# Patient Record
Sex: MALE | Race: BLACK OR AFRICAN AMERICAN | NOT HISPANIC OR LATINO | Employment: OTHER | URBAN - METROPOLITAN AREA
[De-identification: names, ages, dates, MRNs, and addresses within clinical notes are randomized per-mention and may not be internally consistent; named-entity substitution may affect disease eponyms.]

---

## 2023-08-25 PROBLEM — C61 MALIGNANT TUMOR OF PROSTATE (MULTI): Status: ACTIVE | Noted: 2023-08-25

## 2023-08-25 PROBLEM — M25.519 SHOULDER PAIN: Status: ACTIVE | Noted: 2023-08-25

## 2023-08-25 PROBLEM — I10 HYPERTENSION: Status: ACTIVE | Noted: 2023-08-25

## 2023-08-25 PROBLEM — E78.5 HYPERLIPIDEMIA: Status: ACTIVE | Noted: 2023-08-25

## 2023-08-25 PROBLEM — M25.539 WRIST PAIN: Status: ACTIVE | Noted: 2023-08-25

## 2023-08-25 PROBLEM — E10.65 TYPE 1 DIABETES MELLITUS WITH HYPERGLYCEMIA (MULTI): Status: ACTIVE | Noted: 2023-08-25

## 2023-08-25 PROBLEM — I25.10 CORONARY ARTERY DISEASE: Status: ACTIVE | Noted: 2023-08-25

## 2023-08-25 PROBLEM — I95.1 ORTHOSTATIC HYPOTENSION: Status: ACTIVE | Noted: 2023-08-25

## 2023-08-25 PROBLEM — R06.00 DYSPNEA: Status: ACTIVE | Noted: 2023-08-25

## 2023-08-25 PROBLEM — M79.609 PAIN IN LIMB: Status: ACTIVE | Noted: 2023-08-25

## 2023-08-25 PROBLEM — M54.9 BACK PAIN: Status: ACTIVE | Noted: 2023-08-25

## 2023-08-25 PROBLEM — S86.111A STRAIN OF GASTROCNEMIUS MUSCLE, RIGHT, INITIAL ENCOUNTER: Status: ACTIVE | Noted: 2023-08-25

## 2023-08-25 PROBLEM — M62.89 HAMSTRING TIGHTNESS: Status: ACTIVE | Noted: 2023-08-25

## 2023-08-25 PROBLEM — N41.9 PROSTATITIS: Status: ACTIVE | Noted: 2023-08-25

## 2023-08-25 PROBLEM — E78.00 PURE HYPERCHOLESTEROLEMIA: Status: ACTIVE | Noted: 2023-08-25

## 2023-08-25 PROBLEM — E10.319: Status: ACTIVE | Noted: 2023-08-25

## 2023-08-25 PROBLEM — E56.9 VITAMIN DEFICIENCY: Status: ACTIVE | Noted: 2023-08-25

## 2023-08-25 PROBLEM — I21.4: Status: ACTIVE | Noted: 2023-08-25

## 2023-08-25 PROBLEM — H25.12 AGE-RELATED NUCLEAR CATARACT OF LEFT EYE: Status: ACTIVE | Noted: 2023-08-25

## 2023-08-25 PROBLEM — M25.562 BILATERAL KNEE PAIN: Status: ACTIVE | Noted: 2023-08-25

## 2023-08-25 PROBLEM — I20.0 UNSTABLE ANGINA (MULTI): Status: ACTIVE | Noted: 2023-08-25

## 2023-08-25 PROBLEM — H26.9 CATARACT: Status: ACTIVE | Noted: 2023-08-25

## 2023-08-25 PROBLEM — E10.3292: Status: ACTIVE | Noted: 2023-08-25

## 2023-08-25 PROBLEM — H40.1190 POAG (PRIMARY OPEN-ANGLE GLAUCOMA): Status: ACTIVE | Noted: 2023-08-25

## 2023-08-25 PROBLEM — M25.561 BILATERAL KNEE PAIN: Status: ACTIVE | Noted: 2023-08-25

## 2023-08-25 PROBLEM — K40.90 INGUINAL HERNIA, RIGHT: Status: ACTIVE | Noted: 2023-08-25

## 2023-08-25 RX ORDER — HYDROCODONE BITARTRATE AND HOMATROPINE METHYLBROMIDE 5; 1.5 MG/5ML; MG/5ML
5 SOLUTION ORAL NIGHTLY
COMMUNITY
Start: 2018-02-10 | End: 2023-11-29 | Stop reason: WASHOUT

## 2023-08-25 RX ORDER — ASPIRIN 81 MG/1
81 TABLET ORAL DAILY
COMMUNITY

## 2023-08-25 RX ORDER — CLOPIDOGREL BISULFATE 75 MG/1
75 TABLET ORAL DAILY
COMMUNITY
Start: 2017-08-25 | End: 2023-12-06

## 2023-08-25 RX ORDER — EZETIMIBE 10 MG/1
10 TABLET ORAL DAILY
COMMUNITY

## 2023-08-25 RX ORDER — TAMSULOSIN HYDROCHLORIDE 0.4 MG/1
0.4 CAPSULE ORAL DAILY
COMMUNITY
Start: 2015-07-10 | End: 2023-11-29 | Stop reason: WASHOUT

## 2023-08-25 RX ORDER — CARVEDILOL 25 MG/1
12.5 TABLET ORAL
COMMUNITY
Start: 2017-02-24

## 2023-08-25 RX ORDER — INSULIN LISPRO 100 [IU]/ML
INJECTION, SOLUTION INTRAVENOUS; SUBCUTANEOUS
COMMUNITY
Start: 2016-12-06 | End: 2023-11-29 | Stop reason: WASHOUT

## 2023-08-25 RX ORDER — IBUPROFEN 600 MG/1
600 TABLET ORAL 3 TIMES DAILY
COMMUNITY
Start: 2021-04-21 | End: 2023-11-29 | Stop reason: WASHOUT

## 2023-08-25 RX ORDER — BENAZEPRIL HYDROCHLORIDE 20 MG/1
20 TABLET ORAL DAILY
COMMUNITY
Start: 2021-03-13 | End: 2023-11-29 | Stop reason: WASHOUT

## 2023-08-25 RX ORDER — ATORVASTATIN CALCIUM 80 MG/1
80 TABLET, FILM COATED ORAL DAILY
COMMUNITY
Start: 2014-01-31 | End: 2024-03-18

## 2023-08-25 RX ORDER — PEN NEEDLE, DIABETIC 30 GX3/16"
NEEDLE, DISPOSABLE MISCELLANEOUS 4 TIMES DAILY
COMMUNITY
Start: 2020-12-08 | End: 2024-02-01 | Stop reason: WASHOUT

## 2023-08-25 RX ORDER — BENAZEPRIL HYDROCHLORIDE 10 MG/1
10 TABLET ORAL DAILY
COMMUNITY
Start: 2014-02-20 | End: 2024-03-01

## 2023-08-25 RX ORDER — MELOXICAM 7.5 MG/1
7.5 TABLET ORAL DAILY
COMMUNITY
Start: 2020-11-17 | End: 2023-11-29 | Stop reason: WASHOUT

## 2023-08-25 RX ORDER — INSULIN DEGLUDEC 100 U/ML
28 INJECTION, SOLUTION SUBCUTANEOUS DAILY
COMMUNITY
Start: 2021-11-05 | End: 2023-11-29 | Stop reason: WASHOUT

## 2023-08-25 RX ORDER — INSULIN GLARGINE 100 [IU]/ML
30 INJECTION, SOLUTION SUBCUTANEOUS DAILY
COMMUNITY
Start: 2016-08-02 | End: 2023-11-29 | Stop reason: WASHOUT

## 2023-09-21 ENCOUNTER — HOSPITAL ENCOUNTER (OUTPATIENT)
Dept: DATA CONVERSION | Facility: HOSPITAL | Age: 78
Discharge: HOME | End: 2023-09-21
Payer: MEDICARE

## 2023-09-21 DIAGNOSIS — S93.402A SPRAIN OF UNSPECIFIED LIGAMENT OF LEFT ANKLE, INITIAL ENCOUNTER: ICD-10-CM
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
## 2023-09-21 DIAGNOSIS — M25.572 PAIN IN LEFT ANKLE AND JOINTS OF LEFT FOOT: ICD-10-CM

## 2023-09-21 DIAGNOSIS — S99.912A UNSPECIFIED INJURY OF LEFT ANKLE, INITIAL ENCOUNTER: ICD-10-CM

## 2023-10-02 ENCOUNTER — OFFICE VISIT (OUTPATIENT)
Dept: OPHTHALMOLOGY | Facility: CLINIC | Age: 78
End: 2023-10-02
Payer: MEDICARE

## 2023-10-02 DIAGNOSIS — H40.003 GLAUCOMA SUSPECT OF BOTH EYES: Primary | ICD-10-CM

## 2023-10-02 DIAGNOSIS — H52.12 MYOPIA, LEFT: ICD-10-CM

## 2023-10-02 DIAGNOSIS — Z79.4 TYPE 2 DIABETES MELLITUS WITHOUT COMPLICATION, WITH LONG-TERM CURRENT USE OF INSULIN (MULTI): ICD-10-CM

## 2023-10-02 DIAGNOSIS — E11.9 TYPE 2 DIABETES MELLITUS WITHOUT COMPLICATION, WITH LONG-TERM CURRENT USE OF INSULIN (MULTI): ICD-10-CM

## 2023-10-02 PROCEDURE — 92133 CPTRZD OPH DX IMG PST SGM ON: CPT | Performed by: OPHTHALMOLOGY

## 2023-10-02 PROCEDURE — 1126F AMNT PAIN NOTED NONE PRSNT: CPT | Performed by: OPHTHALMOLOGY

## 2023-10-02 PROCEDURE — 99214 OFFICE O/P EST MOD 30 MIN: CPT | Performed by: OPHTHALMOLOGY

## 2023-10-02 PROCEDURE — 1159F MED LIST DOCD IN RCRD: CPT | Performed by: OPHTHALMOLOGY

## 2023-10-02 ASSESSMENT — SLIT LAMP EXAM - LIDS
COMMENTS: PROSTHESIS
COMMENTS: NORMAL

## 2023-10-02 ASSESSMENT — ENCOUNTER SYMPTOMS
ALLERGIC/IMMUNOLOGIC NEGATIVE: 0
NEUROLOGICAL NEGATIVE: 0
GASTROINTESTINAL NEGATIVE: 0
ENDOCRINE NEGATIVE: 0
CARDIOVASCULAR NEGATIVE: 0
MUSCULOSKELETAL NEGATIVE: 0
RESPIRATORY NEGATIVE: 0
HEMATOLOGIC/LYMPHATIC NEGATIVE: 0
PSYCHIATRIC NEGATIVE: 0
CONSTITUTIONAL NEGATIVE: 0
EYES NEGATIVE: 0

## 2023-10-02 ASSESSMENT — CONF VISUAL FIELD
OD_INFERIOR_NASAL_RESTRICTION: 0
OD_SUPERIOR_TEMPORAL_RESTRICTION: 0
OD_INFERIOR_TEMPORAL_RESTRICTION: 0
OD_SUPERIOR_NASAL_RESTRICTION: 0
OD_NORMAL: 1

## 2023-10-02 ASSESSMENT — EXTERNAL EXAM - LEFT EYE: OS_EXAM: NORMAL

## 2023-10-02 ASSESSMENT — VISUAL ACUITY
OS_CC: 20/20
OS_CC+: -1
METHOD: SNELLEN - LINEAR
OD_SC: PROSTHESIS

## 2023-10-02 ASSESSMENT — CUP TO DISC RATIO: OS_RATIO: 0.7

## 2023-10-02 ASSESSMENT — EXTERNAL EXAM - RIGHT EYE: OD_EXAM: NORMAL

## 2023-10-02 ASSESSMENT — TONOMETRY
IOP_METHOD: GOLDMANN APPLANATION
OS_IOP_MMHG: 19

## 2023-10-02 NOTE — PROGRESS NOTES
"Subjective   Chief Complaint    Glaucoma Suspect       HPI       Glaucoma Suspect    In left eye.  Quality: stable.  Side effects of treatment include none.  Treatment compliance is always.             Comments    77 Year old male presents for  glaucoma suspect fuv. Not on drops. Pt denies any changes in vision.           Last edited by ELIZABETH Phipps on 10/2/2023 10:37 AM.        Eye Meds  None     Objective    External Exam         Right Left    External Normal Normal              Slit Lamp Exam         Right Left    Lids/Lashes prosthesis Normal    Conjunctiva/Sclera  White and quiet    Cornea  Clear    Anterior Chamber  Deep and quiet    Iris  Round and reactive    Lens 1-2+ Nuclear sclerosis 1-2+ Nuclear sclerosis    Anterior Vitreous  Normal              Fundus Exam         Right Left    Disc prosthesis Normal    C/D Ratio  0.55    Macula  Normal    Vessels  Normal    Periphery  Normal                   <div id=\"MAIN_EXAM_REVIEWED\"></div>     OCT: robust RNFL left eye (OS) G74 stable          Assessment/Plan    Glaucoma Suspect    Plan    diabetes without  retinopathy, left eye    continue BS control         glaucoma suspect, left eye:    no family hx    no trauma    gonio open    pach thin    oct rnfl 10/2/23  still overal robust with good rim area, stable from previous    HVF 24-2 7/28/21 WNL, no evidence of glaucoma    IOP stable, slightly higher than ideal given thinner pach    will monitor closely given monocular    rtc 6 months for HVF 24-2 and DFE         monoocula rstatus: 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 monitor         myopia with presbyopia, left eye:    Establish with Dr. Staton for mrx         "

## 2023-10-09 ENCOUNTER — APPOINTMENT (OUTPATIENT)
Dept: ENDOCRINOLOGY | Facility: CLINIC | Age: 78
End: 2023-10-09
Payer: MEDICARE

## 2023-10-09 NOTE — PROGRESS NOTES
76yo with dm1 here for tslim/dexcom pump training.     Medications:           Tresiba 23 units           Humalog ICR 1:4,  isf 20 >120    1. 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

## 2023-11-07 ENCOUNTER — PHARMACY VISIT (OUTPATIENT)
Dept: PHARMACY | Facility: CLINIC | Age: 78
End: 2023-11-07
Payer: COMMERCIAL

## 2023-11-07 DIAGNOSIS — E10.319 TYPE 1 DIABETES MELLITUS WITH RETINOPATHY OF BOTH EYES WITHOUT MACULAR EDEMA, UNSPECIFIED RETINOPATHY SEVERITY (MULTI): Primary | ICD-10-CM

## 2023-11-07 PROCEDURE — RXMED WILLOW AMBULATORY MEDICATION CHARGE

## 2023-11-07 RX ORDER — INSULIN LISPRO 100 [IU]/ML
INJECTION, SOLUTION INTRAVENOUS; SUBCUTANEOUS
Qty: 90 ML | Refills: 3 | Status: SHIPPED | OUTPATIENT
Start: 2023-11-07 | End: 2023-11-29 | Stop reason: WASHOUT

## 2023-11-27 ENCOUNTER — OFFICE VISIT (OUTPATIENT)
Dept: OPHTHALMOLOGY | Facility: CLINIC | Age: 78
End: 2023-11-27
Payer: MEDICARE

## 2023-11-27 DIAGNOSIS — H52.4 PRESBYOPIA: ICD-10-CM

## 2023-11-27 DIAGNOSIS — H25.12 AGE-RELATED NUCLEAR CATARACT OF LEFT EYE: Primary | ICD-10-CM

## 2023-11-27 DIAGNOSIS — H52.12 MYOPIA, LEFT EYE: ICD-10-CM

## 2023-11-27 PROCEDURE — 92012 INTRM OPH EXAM EST PATIENT: CPT | Performed by: STUDENT IN AN ORGANIZED HEALTH CARE EDUCATION/TRAINING PROGRAM

## 2023-11-27 PROCEDURE — 92015 DETERMINE REFRACTIVE STATE: CPT | Performed by: STUDENT IN AN ORGANIZED HEALTH CARE EDUCATION/TRAINING PROGRAM

## 2023-11-27 ASSESSMENT — EXTERNAL EXAM - LEFT EYE: OS_EXAM: NORMAL

## 2023-11-27 ASSESSMENT — REFRACTION_MANIFEST
OS_SPHERE: -3.00
OS_ADD: +2.75
OD_SPHERE: BALANCE
OS_CYLINDER: +1.25
OS_AXIS: 115

## 2023-11-27 ASSESSMENT — CONF VISUAL FIELD
OD_SUPERIOR_NASAL_RESTRICTION: 1
OD_INFERIOR_TEMPORAL_RESTRICTION: 1
OS_INFERIOR_TEMPORAL_RESTRICTION: 0
OS_INFERIOR_NASAL_RESTRICTION: 0
OD_INFERIOR_NASAL_RESTRICTION: 1
OS_SUPERIOR_NASAL_RESTRICTION: 0
OS_NORMAL: 1
OD_SUPERIOR_TEMPORAL_RESTRICTION: 1
COMMENTS: PROSTHESIS OD
OS_SUPERIOR_TEMPORAL_RESTRICTION: 0

## 2023-11-27 ASSESSMENT — VISUAL ACUITY
OS_CC+: +2
CORRECTION_TYPE: GLASSES
OS_CC: 20/25
METHOD: SNELLEN - LINEAR
OD_SC: PROSTHESIS

## 2023-11-27 ASSESSMENT — REFRACTION_WEARINGRX
OS_ADD: +2.75
OS_AXIS: 112
OS_CYLINDER: +1.00
OD_SPHERE: BALANCE
OS_SPHERE: -3.50

## 2023-11-27 ASSESSMENT — EXTERNAL EXAM - RIGHT EYE: OD_EXAM: NORMAL

## 2023-11-27 ASSESSMENT — ENCOUNTER SYMPTOMS
EYES NEGATIVE: 0
CONSTITUTIONAL NEGATIVE: 0
GASTROINTESTINAL NEGATIVE: 0
RESPIRATORY NEGATIVE: 0
HEMATOLOGIC/LYMPHATIC NEGATIVE: 0
ALLERGIC/IMMUNOLOGIC NEGATIVE: 0
ENDOCRINE NEGATIVE: 0
PSYCHIATRIC NEGATIVE: 0
MUSCULOSKELETAL NEGATIVE: 0
CARDIOVASCULAR NEGATIVE: 0
NEUROLOGICAL NEGATIVE: 0

## 2023-11-27 ASSESSMENT — CUP TO DISC RATIO: OS_RATIO: 0.7

## 2023-11-27 ASSESSMENT — TONOMETRY
OS_IOP_MMHG: 20
IOP_METHOD: GOLDMANN APPLANATION
OD_IOP_MMHG: PROSTHESIS

## 2023-11-27 ASSESSMENT — SLIT LAMP EXAM - LIDS
COMMENTS: DERMATOCHALASIS
COMMENTS: DERMATOCHALASIS

## 2023-11-27 NOTE — PROGRESS NOTES
Assessment/Plan   Diagnoses and all orders for this visit:  Age-related nuclear cataract of left eye  Myopia, left eye  Presbyopia  -patient is being monitored for comprehensive exams with Dr. Simon  -continue care as directed  -updated spec RX at today's exam with BCVA left eye (OS) 20/20. Right eye (OD) prosthetic  -discussed monocular precautions with FTW and polycarbonate  -cataract is non visually significant at this time. Monitor. Pt ed.    RTC with Dr. Simon as directed

## 2023-11-29 ENCOUNTER — OFFICE VISIT (OUTPATIENT)
Dept: PRIMARY CARE | Facility: CLINIC | Age: 78
End: 2023-11-29
Payer: MEDICARE

## 2023-11-29 VITALS
DIASTOLIC BLOOD PRESSURE: 82 MMHG | TEMPERATURE: 98 F | SYSTOLIC BLOOD PRESSURE: 138 MMHG | HEART RATE: 66 BPM | WEIGHT: 201 LBS | HEIGHT: 71 IN | OXYGEN SATURATION: 98 % | BODY MASS INDEX: 28.14 KG/M2

## 2023-11-29 DIAGNOSIS — N50.82 SCROTAL PAIN: Primary | ICD-10-CM

## 2023-11-29 DIAGNOSIS — N45.1 EPIDIDYMITIS: ICD-10-CM

## 2023-11-29 PROBLEM — I50.9 HEART FAILURE (MULTI): Status: ACTIVE | Noted: 2023-11-29

## 2023-11-29 PROBLEM — M25.569 KNEE PAIN: Status: ACTIVE | Noted: 2023-08-25

## 2023-11-29 PROBLEM — M25.579 ANKLE PAIN: Status: ACTIVE | Noted: 2023-11-29

## 2023-11-29 PROCEDURE — 1159F MED LIST DOCD IN RCRD: CPT | Performed by: INTERNAL MEDICINE

## 2023-11-29 PROCEDURE — 1160F RVW MEDS BY RX/DR IN RCRD: CPT | Performed by: INTERNAL MEDICINE

## 2023-11-29 PROCEDURE — 3075F SYST BP GE 130 - 139MM HG: CPT | Performed by: INTERNAL MEDICINE

## 2023-11-29 PROCEDURE — 99213 OFFICE O/P EST LOW 20 MIN: CPT | Performed by: INTERNAL MEDICINE

## 2023-11-29 PROCEDURE — 1036F TOBACCO NON-USER: CPT | Performed by: INTERNAL MEDICINE

## 2023-11-29 PROCEDURE — 1125F AMNT PAIN NOTED PAIN PRSNT: CPT | Performed by: INTERNAL MEDICINE

## 2023-11-29 PROCEDURE — 3079F DIAST BP 80-89 MM HG: CPT | Performed by: INTERNAL MEDICINE

## 2023-11-29 RX ORDER — SULFAMETHOXAZOLE AND TRIMETHOPRIM 800; 160 MG/1; MG/1
1 TABLET ORAL 2 TIMES DAILY
Qty: 10 TABLET | Refills: 0 | Status: SHIPPED | OUTPATIENT
Start: 2023-11-29 | End: 2023-12-04

## 2023-11-29 RX ORDER — SULFAMETHOXAZOLE AND TRIMETHOPRIM 800; 160 MG/1; MG/1
1 TABLET ORAL 2 TIMES DAILY
Qty: 10 TABLET | Refills: 0 | Status: SHIPPED | OUTPATIENT
Start: 2023-11-29 | End: 2023-11-29 | Stop reason: ENTERED-IN-ERROR

## 2023-11-29 RX ORDER — INSULIN LISPRO 100 [IU]/ML
INJECTION, SOLUTION INTRAVENOUS; SUBCUTANEOUS
COMMUNITY
End: 2024-03-05 | Stop reason: WASHOUT

## 2023-11-29 ASSESSMENT — PAIN SCALES - GENERAL: PAINLEVEL: 3

## 2023-11-29 ASSESSMENT — ENCOUNTER SYMPTOMS
LOSS OF SENSATION IN FEET: 0
DEPRESSION: 0
OCCASIONAL FEELINGS OF UNSTEADINESS: 0

## 2023-11-29 ASSESSMENT — PATIENT HEALTH QUESTIONNAIRE - PHQ9
1. LITTLE INTEREST OR PLEASURE IN DOING THINGS: NOT AT ALL
2. FEELING DOWN, DEPRESSED OR HOPELESS: NOT AT ALL
SUM OF ALL RESPONSES TO PHQ9 QUESTIONS 1 AND 2: 0

## 2023-11-29 NOTE — PROGRESS NOTES
"Subjective   Patient ID: Lowell Mcneill is a 77 y.o. male who presents for Testicle Pain.    Patient is being seen for acute visit with a complaint of pain in his right testicle which has been going on for the last 3 to 4 days.  Patient stated that he started having some pain in his testicle.  Denies any trauma.  Denies any burning with urination.  Patient does have history of prostate cancer and has radiation seeds placement done.  Patient also has a previous history of hernia repair done on the same side.         Review of Systems  Negative for fever or chills  Negative for burning with urination  Positive for right testicular pain  Negative for constipation.    Objective   /82   Pulse 66   Temp 36.7 °C (98 °F) (Temporal)   Ht 1.816 m (5' 11.5\")   Wt 91.2 kg (201 lb)   SpO2 98%   BMI 27.65 kg/m²     Physical Exam  Alert awake oriented x 3  Numbness present over the right testes.  No scrotal swelling no fluid.  No hernia  Assessment/Plan   Diagnoses and all orders for this visit:  Scrotal pain  -     US scrotum; Future  Epididymitis  -     sulfamethoxazole-trimethoprim (Bactrim DS) 800-160 mg tablet; Take 1 tablet by mouth 2 times a day for 5 days.         "

## 2023-12-03 ENCOUNTER — HOSPITAL ENCOUNTER (EMERGENCY)
Facility: HOSPITAL | Age: 78
Discharge: HOME | End: 2023-12-03
Attending: STUDENT IN AN ORGANIZED HEALTH CARE EDUCATION/TRAINING PROGRAM
Payer: MEDICARE

## 2023-12-03 VITALS
TEMPERATURE: 97.9 F | OXYGEN SATURATION: 98 % | HEART RATE: 70 BPM | DIASTOLIC BLOOD PRESSURE: 70 MMHG | HEIGHT: 72 IN | BODY MASS INDEX: 27.17 KG/M2 | RESPIRATION RATE: 16 BRPM | SYSTOLIC BLOOD PRESSURE: 159 MMHG | WEIGHT: 200.62 LBS

## 2023-12-03 PROCEDURE — 4500999001 HC ED NO CHARGE

## 2023-12-03 PROCEDURE — 99281 EMR DPT VST MAYX REQ PHY/QHP: CPT | Performed by: STUDENT IN AN ORGANIZED HEALTH CARE EDUCATION/TRAINING PROGRAM

## 2023-12-03 ASSESSMENT — PAIN SCALES - GENERAL: PAINLEVEL_OUTOF10: 0 - NO PAIN

## 2023-12-03 ASSESSMENT — COLUMBIA-SUICIDE SEVERITY RATING SCALE - C-SSRS
2. HAVE YOU ACTUALLY HAD ANY THOUGHTS OF KILLING YOURSELF?: NO
6. HAVE YOU EVER DONE ANYTHING, STARTED TO DO ANYTHING, OR PREPARED TO DO ANYTHING TO END YOUR LIFE?: NO
1. IN THE PAST MONTH, HAVE YOU WISHED YOU WERE DEAD OR WISHED YOU COULD GO TO SLEEP AND NOT WAKE UP?: NO

## 2023-12-03 ASSESSMENT — PAIN - FUNCTIONAL ASSESSMENT: PAIN_FUNCTIONAL_ASSESSMENT: 0-10

## 2023-12-03 NOTE — ED TRIAGE NOTES
Patient whom is aox4 in triage requesting assistance with insulin pump that is used at home, insulin pump was not working properly therefore was not administering the proper amount of insulin. This RN helped to educate patient about filling insulin pump and cartridge. Patient has a follow up apt with MD on Tuesday to further review insulin pump and usage. Patient was offered a room and to meet with MD but patient declined r/t insulin pump being back in service and working with plenty of insulin to get patient through Tuesday morning.

## 2023-12-03 NOTE — ED PROVIDER NOTES
I assigned myself the patient however patient left without being seen after being triaged.  I did not physically evaluate the patient as the patient had left prior to being placed in a patient exam room.     Real Modi, DO  12/03/23 0746

## 2023-12-05 ENCOUNTER — APPOINTMENT (OUTPATIENT)
Dept: ENDOCRINOLOGY | Facility: CLINIC | Age: 78
End: 2023-12-05
Payer: MEDICARE

## 2023-12-05 ENCOUNTER — CLINICAL SUPPORT (OUTPATIENT)
Dept: ENDOCRINOLOGY | Facility: CLINIC | Age: 78
End: 2023-12-05
Payer: MEDICARE

## 2023-12-05 VITALS
DIASTOLIC BLOOD PRESSURE: 50 MMHG | HEART RATE: 65 BPM | WEIGHT: 198.8 LBS | SYSTOLIC BLOOD PRESSURE: 106 MMHG | BODY MASS INDEX: 26.93 KG/M2 | HEIGHT: 72 IN

## 2023-12-05 DIAGNOSIS — E78.2 MIXED HYPERLIPIDEMIA: ICD-10-CM

## 2023-12-05 DIAGNOSIS — I10 HYPERTENSION, UNSPECIFIED TYPE: ICD-10-CM

## 2023-12-05 DIAGNOSIS — E10.65 TYPE 1 DIABETES MELLITUS WITH HYPERGLYCEMIA (MULTI): Primary | ICD-10-CM

## 2023-12-05 LAB — POC HEMOGLOBIN A1C: 9 % (ref 4.2–6.5)

## 2023-12-05 PROCEDURE — 83036 HEMOGLOBIN GLYCOSYLATED A1C: CPT | Performed by: INTERNAL MEDICINE

## 2023-12-05 PROCEDURE — 95251 CONT GLUC MNTR ANALYSIS I&R: CPT | Performed by: INTERNAL MEDICINE

## 2023-12-05 PROCEDURE — 99214 OFFICE O/P EST MOD 30 MIN: CPT | Performed by: INTERNAL MEDICINE

## 2023-12-05 NOTE — PROGRESS NOTES
Subjective   Patient ID: Lowell Mcneill is a 77 y.o. male who presents for Diabetes.  HPI  Previous pt of Dr. 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 He recently started on Tandem tSlim pump with Dexcom G6 about a month ago - had initial training with Amalia on 11/7/23, and reports meeting with her a second time in the office for additional training.     Last A1c 9.8% - today 9%.   Pt is testing sugars 4+ times per day with Dexcom G6. Pt is having low sugars 0-1 times/week. Pt is following a carb controlled diet and knows reasonable carb allowances - reports feeling confident in his carb counts and has been working on adding them to his pump when he eats. Pt is able to afford their medications. Pt is exercising, does martial arts.    Pump:   Tandem tslim using control IQ with Dexcom G6 hybrid closed loop.   Basal:  MN-4a: 1.2  4a-12p: 1.2  12p-MN: 1.5  ICR:   MN-4a: 1:8  4a-12p: 1:6  12p-MN: 1:6  ISF:   MN-4a: 30  4a-12p: 25  12p-MN: 25  Target: 110    30 days of Dexcom G6 data: 55% in range, 0% low, pattern: low-mid 100's overnight and on waking, upper 100's / low 200's after meals, occ into 300's after some meals, typ low 200's / upper 100's and trending down before bed.   - Sugars tend to spike after most meals / carb entries and require multiple auto-boluses in order to get back into target range.     Previous insulin regimen: tresiba 23 units and humalog 1:4, plus isf 20>120.     Taking atorvastatin 80mg and zetia 10mg for lipids and tolerating.  Taking benazaprl 10mg and carvedilol 25mg (1/2 bid) for htn.    Current Outpatient Medications:     aspirin 81 mg EC tablet, Take 1 tablet (81 mg) by mouth once daily., Disp: , Rfl:     atorvastatin (Lipitor) 80 mg tablet, Take 1 tablet (80 mg) by mouth once daily., Disp: , Rfl:     benazepril (Lotensin) 10 mg tablet, Take 1 tablet (10 mg) by mouth once daily., Disp: , Rfl:      "carvedilol (Coreg) 25 mg tablet, Take 1 tablet (25 mg) by mouth 2 times a day with meals., Disp: , Rfl:     ezetimibe (Zetia) 10 mg tablet, Take 1 tablet (10 mg) by mouth once daily., Disp: , Rfl:     insulin lispro (HumaLOG) 100 unit/mL injection, Inject under the skin 3 times a day with meals. Take as directed per insulin instructions., Disp: , Rfl:     patient supplied pump Patient Supplied Medication Pump - INSULIN, Inject under the skin continuously., Disp: , Rfl:     pen needle, diabetic 32 gauge x 5/32\" needle, 4 times a day., Disp: , Rfl:     clopidogrel (Plavix) 75 mg tablet, TAKE 1 TABLET BY MOUTH EVERY DAY, Disp: 90 tablet, Rfl: 3    tamsulosin (Flomax) 0.4 mg 24 hr capsule, Take 1 capsule (0.4 mg) by mouth once daily., Disp: , Rfl:    Past Medical History:   Diagnosis Date    CAD (coronary artery disease)     Diabetes mellitus type 1 (CMS/HCC)     Diabetic retinopathy (CMS/HCC)     Disorder 08/25/2023    HTN (hypertension)     Hypercholesteremia     Pain in scrotum 12/07/2023    Personal history of other diseases of the nervous system and sense organs     History of cataract    Presence of 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     Objective   Physical Exam  /50   Pulse 65   Ht 1.816 m (5' 11.5\")   Wt 90.2 kg (198 lb 12.8 oz)   BMI 27.34 kg/m²     Assessment/Plan   Problem List Items Addressed This Visit       Hyperlipidemia     On statin and tolerating.   No changes to current regimen.          Hypertension     At target on current therapy.   No changes.          Type 1 diabetes mellitus with hyperglycemia (CMS/HCC) - Primary     A1c 9% today - not at goal, but improving after just one month on pump.  Based on available CGM / pump data, pt could use adjustment of ICR and ISF during the day - currently going high after most meals and requiring multiple auto-boluses to get sugar back into target range. "     Plan:   Changes made to pump settings today:   - ICR from 4am to MN - 1:6 to 1:5  - Correction factor from 4am to MN - 25 to 20  Follow up with Rupali in 3 months - call for an earlier appt if needed.          Relevant Orders    POCT glycosylated hemoglobin (Hb A1C) manually resulted (Completed)     Treatment and plan discussed with Dr. Te Martinez.   DEVONTE Singleton, PharmD, BCACP, CDCES.

## 2023-12-05 NOTE — PATIENT INSTRUCTIONS
Your A1c was 9% today.     Changes made to your pump settings today:   - ICR at 4am to 12pm - 1:6 to 1:5  - ICR at 12pm to midnight - 1:6 to 1:5  - Correction factor at 4am to 12pm - 25 to 20  - Correction factor at 12pm to midnight - 25 to 20    Follow up with Rupali in 3 months - call for an earlier appt if needed.     Keep up the great work!!

## 2023-12-06 DIAGNOSIS — I21.4 NON-ST ELEVATION (NSTEMI) MYOCARDIAL INFARCTION (MULTI): ICD-10-CM

## 2023-12-06 RX ORDER — CLOPIDOGREL BISULFATE 75 MG/1
75 TABLET ORAL DAILY
Qty: 90 TABLET | Refills: 3 | Status: SHIPPED | OUTPATIENT
Start: 2023-12-06

## 2023-12-07 ENCOUNTER — TELEPHONE (OUTPATIENT)
Dept: PRIMARY CARE | Facility: CLINIC | Age: 78
End: 2023-12-07
Payer: MEDICARE

## 2023-12-07 ENCOUNTER — ANCILLARY PROCEDURE (OUTPATIENT)
Dept: RADIOLOGY | Facility: CLINIC | Age: 78
End: 2023-12-07
Payer: MEDICARE

## 2023-12-07 DIAGNOSIS — N50.82 SCROTAL PAIN: ICD-10-CM

## 2023-12-07 PROBLEM — R69 DISORDER: Status: RESOLVED | Noted: 2023-08-25 | Resolved: 2023-12-07

## 2023-12-07 PROCEDURE — 93975 VASCULAR STUDY: CPT

## 2023-12-07 RX ORDER — TAMSULOSIN HYDROCHLORIDE 0.4 MG/1
0.4 CAPSULE ORAL DAILY
COMMUNITY
Start: 2023-12-02

## 2023-12-07 NOTE — TELEPHONE ENCOUNTER
----- Message from Darwin Mims MD sent at 12/7/2023  1:41 PM EST -----  Pt. Has inflammation of upper part of testis for which he was started on antibiotics.No further treatment.

## 2023-12-11 NOTE — ASSESSMENT & PLAN NOTE
A1c 9% today - not at goal, but improving after just one month on pump.  Based on available CGM / pump data, pt could use adjustment of ICR and ISF during the day - currently going high after most meals and requiring multiple auto-boluses to get sugar back into target range.     Plan:   Changes made to pump settings today:   - ICR from 4am to MN - 1:6 to 1:5  - Correction factor from 4am to MN - 25 to 20  Follow up with Rupali in 3 months - call for an earlier appt if needed.

## 2023-12-11 NOTE — PROGRESS NOTES
I, Dr Te Martinez, have reviewed this progress note, medication list, vital signs, any pertinent lab values, and any CGM data if present with the [facility] and  face to face during this visit today. This note reflects the treatment plan that was made under my direction after reviewing the above mentioned elements while face to face with the patient and CDE.  I personally answered and addressed any questions and concerns the patient had during the visit today.  The CDE entered the data in this note under my direction and I personally reviewed it, signed any lab or medication orders that I instructed to be completed. I am the billing provider for this visit and the level of service was determined by my involvement in the Medical Decision Making Component of this visit while face to face with the patient.    Te Martinez MD

## 2023-12-27 ENCOUNTER — OFFICE VISIT (OUTPATIENT)
Dept: PRIMARY CARE | Facility: CLINIC | Age: 78
End: 2023-12-27
Payer: MEDICARE

## 2023-12-27 VITALS
OXYGEN SATURATION: 99 % | DIASTOLIC BLOOD PRESSURE: 70 MMHG | HEART RATE: 77 BPM | SYSTOLIC BLOOD PRESSURE: 150 MMHG | BODY MASS INDEX: 27.92 KG/M2 | WEIGHT: 203 LBS

## 2023-12-27 DIAGNOSIS — I10 PRIMARY HYPERTENSION: Primary | ICD-10-CM

## 2023-12-27 PROCEDURE — 1159F MED LIST DOCD IN RCRD: CPT | Performed by: INTERNAL MEDICINE

## 2023-12-27 PROCEDURE — 3077F SYST BP >= 140 MM HG: CPT | Performed by: INTERNAL MEDICINE

## 2023-12-27 PROCEDURE — 1126F AMNT PAIN NOTED NONE PRSNT: CPT | Performed by: INTERNAL MEDICINE

## 2023-12-27 PROCEDURE — 1036F TOBACCO NON-USER: CPT | Performed by: INTERNAL MEDICINE

## 2023-12-27 PROCEDURE — 99213 OFFICE O/P EST LOW 20 MIN: CPT | Performed by: INTERNAL MEDICINE

## 2023-12-27 PROCEDURE — 3078F DIAST BP <80 MM HG: CPT | Performed by: INTERNAL MEDICINE

## 2023-12-27 RX ORDER — AMLODIPINE BESYLATE 2.5 MG/1
2.5 TABLET ORAL DAILY
Qty: 90 TABLET | Refills: 0 | Status: SHIPPED | OUTPATIENT
Start: 2023-12-27 | End: 2024-02-01 | Stop reason: DRUGHIGH

## 2023-12-27 ASSESSMENT — ENCOUNTER SYMPTOMS
DEPRESSION: 0
LOSS OF SENSATION IN FEET: 0
OCCASIONAL FEELINGS OF UNSTEADINESS: 0

## 2023-12-27 ASSESSMENT — PAIN SCALES - GENERAL: PAINLEVEL: 0-NO PAIN

## 2023-12-27 NOTE — PROGRESS NOTES
Subjective   Patient ID: Lowell Mcneill is a 78 y.o. male who presents for Follow-up (Pt states that on diego that he was very sweaty and lightheaded. Pt states that he took his blood pressure at home and it was 219/69).    Patient is a 78-year-old male who is being seen for acute visit.  Patient had an episode of feeling lightheaded at home.  Patient blood pressure was elevated.  Patient also related that he was not watching salt in his diet over the last few days.  Denies any other episode of dizziness.  Patient blood pressure is mildly on the higher side.  Patient is being started on amlodipine in addition to his previous medications.         Review of Systems  Denies any fever or chills  No nasal discharge no recent URI  No cough  No chest pain  No neurological weakness.  Objective   /70 (BP Location: Right arm, Patient Position: Sitting, BP Cuff Size: Adult)   Pulse 77   Wt 92.1 kg (203 lb)   SpO2 99%   BMI 27.92 kg/m²     Physical Exam  Patient is alert recounted x 3  Bilateral ear canals are normal, oral cavity is normal  Chest clear to auscultate bilaterally  CVS S1-S2 heard normally no murmur no gallop or rubs  No carotid bruit  Cranial nerves II to XII are grossly intact deep tendon reflexes 2+ in all 4 limbs.  Assessment/Plan   Diagnoses and all orders for this visit:  Primary hypertension  -     amLODIPine (Norvasc) 2.5 mg tablet; Take 1 tablet (2.5 mg) by mouth once daily.    Patient's blood pressure is not very well-controlled.  Most likely patient had a dizziness from elevated blood pressure and consuming more salt during the holidays.  Patient recommended to cut down on the salt and patient is being started on amlodipine in addition to other medications.

## 2024-01-16 ENCOUNTER — TELEPHONE (OUTPATIENT)
Dept: PRIMARY CARE | Facility: CLINIC | Age: 79
End: 2024-01-16
Payer: MEDICARE

## 2024-01-16 DIAGNOSIS — I10 PRIMARY HYPERTENSION: ICD-10-CM

## 2024-01-16 NOTE — TELEPHONE ENCOUNTER
Dr Moreno reviewed Pt's BP readings dropped off by patient.  Per dr moreno, pt needs to double up on his Amlodipine to make it 5mg a day. Pt needs to keep on the BP log and we will review them at his 2/1/24 appt.

## 2024-02-01 ENCOUNTER — OFFICE VISIT (OUTPATIENT)
Dept: PRIMARY CARE | Facility: CLINIC | Age: 79
End: 2024-02-01
Payer: MEDICARE

## 2024-02-01 VITALS
DIASTOLIC BLOOD PRESSURE: 60 MMHG | TEMPERATURE: 97.3 F | WEIGHT: 197 LBS | OXYGEN SATURATION: 98 % | HEART RATE: 75 BPM | BODY MASS INDEX: 26.68 KG/M2 | HEIGHT: 72 IN | SYSTOLIC BLOOD PRESSURE: 124 MMHG

## 2024-02-01 DIAGNOSIS — C61 MALIGNANT TUMOR OF PROSTATE (MULTI): ICD-10-CM

## 2024-02-01 DIAGNOSIS — I50.9 CHRONIC HEART FAILURE, UNSPECIFIED HEART FAILURE TYPE (MULTI): ICD-10-CM

## 2024-02-01 DIAGNOSIS — N18.31 STAGE 3A CHRONIC KIDNEY DISEASE (MULTI): ICD-10-CM

## 2024-02-01 DIAGNOSIS — E10.3292: ICD-10-CM

## 2024-02-01 DIAGNOSIS — I10 PRIMARY HYPERTENSION: Primary | ICD-10-CM

## 2024-02-01 DIAGNOSIS — E10.319 TYPE 1 DIABETES MELLITUS WITH RETINOPATHY OF BOTH EYES WITHOUT MACULAR EDEMA, UNSPECIFIED RETINOPATHY SEVERITY (MULTI): ICD-10-CM

## 2024-02-01 PROCEDURE — 3074F SYST BP LT 130 MM HG: CPT | Performed by: INTERNAL MEDICINE

## 2024-02-01 PROCEDURE — 1036F TOBACCO NON-USER: CPT | Performed by: INTERNAL MEDICINE

## 2024-02-01 PROCEDURE — 99214 OFFICE O/P EST MOD 30 MIN: CPT | Performed by: INTERNAL MEDICINE

## 2024-02-01 PROCEDURE — 3078F DIAST BP <80 MM HG: CPT | Performed by: INTERNAL MEDICINE

## 2024-02-01 PROCEDURE — 1126F AMNT PAIN NOTED NONE PRSNT: CPT | Performed by: INTERNAL MEDICINE

## 2024-02-01 PROCEDURE — 1159F MED LIST DOCD IN RCRD: CPT | Performed by: INTERNAL MEDICINE

## 2024-02-01 RX ORDER — AMLODIPINE BESYLATE 5 MG/1
5 TABLET ORAL DAILY
Qty: 90 TABLET | Refills: 3 | Status: SHIPPED | OUTPATIENT
Start: 2024-02-01 | End: 2025-01-31

## 2024-02-01 ASSESSMENT — PATIENT HEALTH QUESTIONNAIRE - PHQ9
2. FEELING DOWN, DEPRESSED OR HOPELESS: NOT AT ALL
1. LITTLE INTEREST OR PLEASURE IN DOING THINGS: NOT AT ALL
SUM OF ALL RESPONSES TO PHQ9 QUESTIONS 1 AND 2: 0

## 2024-02-01 ASSESSMENT — ENCOUNTER SYMPTOMS
DEPRESSION: 0
OCCASIONAL FEELINGS OF UNSTEADINESS: 0
LOSS OF SENSATION IN FEET: 0

## 2024-02-01 ASSESSMENT — PAIN SCALES - GENERAL: PAINLEVEL: 0-NO PAIN

## 2024-02-01 NOTE — PROGRESS NOTES
[facility]: [facility]  PROGRESS NOTE      Lowell Mcneill is a 78 y.o. male that is being seen  today for Follow-up.  Subjective   Is a 78-year-old male with a past medical history of coronary artery disease, diabetes mellitus, hypertension, hyperlipidemia who is being seen for follow-up.  Patient follow-up with the endocrinologist and his last hemoglobin A1c was 9.0 in October.  Patient has been on insulin pump.  Patient was started on blood pressure medication amlodipine 2.5 mg few weeks ago.  Patient has been checking his blood pressures at home and they were not well-controlled and dose of amlodipine was increased to 5 mg.  Patient blood pressure in the office is much better controlled now.  Checked twice.  Patient has been checking it at home and readings have been high.  Patient recommended to get his machine calibrated or bring it to the office to compare with the manual readings.  Overall patient has been watching his diet and has been pretty active on daily basis and has lost couple of pounds which is intentional.      ROS  Negative for fever or chills  Negative for sore throat, ear pain, nasal discharge  Negative for cough, shortness of breath or wheezing  Negative for chest pain, palpitations, swelling of legs  Negative for abdominal pain, constipation, diarrhea, blood in the stools  Negative for urinary complaints  Negative for headache, dizziness, weakness or numbness  Negative for joint pain  Negative for depression or anxiety  All other systems reviewed and were negative   Vitals:    02/01/24 1108   BP: 124/60   Pulse: 75   Temp: 36.3 °C (97.3 °F)   SpO2: 98%      Vitals:    02/01/24 1108   Weight: 89.4 kg (197 lb)     Body mass index is 27.09 kg/m².  Physical Exam  Constitutional: Patient does not appear to be in any acute distress  Head and Face: NCAT  Eyes: Normal external exam, EOMI  ENT: Normal external inspection of ears and nose. Oropharynx normal.  Cardiovascular: RRR,  S1/S2, no murmurs, rubs, or gallops, radial pulses +2, no edema of extremities  Pulmonary: CTAB, no respiratory distress.  Abdomen: +BS, soft, non-tender, nondistended, no guarding or rebound, no masses noted  MSK: No joint swelling, normal movements of all extremities. Range of motion- normal.  Skin- No lesions, contusions, or erythema.  Peripheral puslses palpable bilaterally 2+  Neuro: AAO X3, Cranial nerves 2-12 grossly intact,DTR 2+ in all 4 limbs   Psychiatric: Judgment intact. Appropriate mood and behavior    LABS   [unfilled]  Lab Results   Component Value Date    GLUCOSE 126 (H) 07/13/2023    CALCIUM 9.3 07/13/2023     07/13/2023    K 4.6 07/13/2023    CO2 23 (L) 07/13/2023     07/13/2023    BUN 20 07/13/2023    CREATININE 1.3 07/13/2023     Lab Results   Component Value Date    ALT 26 10/14/2022    AST 21 10/14/2022    ALKPHOS 83 10/14/2022    BILITOT 0.4 10/14/2022     Lab Results   Component Value Date    WBC 5.7 04/08/2022    HGB 13.1 (L) 04/08/2022    HCT 38.6 (L) 04/08/2022    MCV 83.9 04/08/2022     04/08/2022     Lab Results   Component Value Date    CHOL 93 (L) 04/08/2022    CHOL 96 04/19/2021    CHOL 122 (L) 09/23/2020     Lab Results   Component Value Date    HDL 51 04/08/2022    HDL 49.7 04/19/2021    HDL 66 09/23/2020     Lab Results   Component Value Date    LDLCALC 31 (L) 04/08/2022    LDLCALC 41 (L) 09/23/2020     Lab Results   Component Value Date    TRIG 55 04/08/2022    TRIG 66 04/19/2021    TRIG 73 09/23/2020     Lab Results   Component Value Date    HGBA1C 9 (A) 12/05/2023     Other labs not included in the list above were reviewed either before or during this encounter.    History    Past Medical History:   Diagnosis Date    CAD (coronary artery disease)     Diabetes mellitus type 1 (CMS/HCC)     Diabetic retinopathy (CMS/HCC)     Disorder 08/25/2023    HTN (hypertension)     Hypercholesteremia     Pain in scrotum 12/07/2023    Personal history of other diseases of  "the nervous system and sense organs     History of cataract    Presence of artificial eye 08/14/2018    Eye globe prosthesis    Prostate cancer (CMS/HCC)      Past Surgical History:   Procedure Laterality Date    OTHER SURGICAL HISTORY  09/11/2018    Globe Enucleation Right     No family history on file.  No Known Allergies  Current Outpatient Medications on File Prior to Visit   Medication Sig Dispense Refill    aspirin 81 mg EC tablet Take 1 tablet (81 mg) by mouth once daily.      atorvastatin (Lipitor) 80 mg tablet Take 1 tablet (80 mg) by mouth once daily.      benazepril (Lotensin) 10 mg tablet Take 1 tablet (10 mg) by mouth once daily.      carvedilol (Coreg) 25 mg tablet Take 0.5 tablets (12.5 mg) by mouth 2 times a day with meals.      clopidogrel (Plavix) 75 mg tablet TAKE 1 TABLET BY MOUTH EVERY DAY 90 tablet 3    ezetimibe (Zetia) 10 mg tablet Take 1 tablet (10 mg) by mouth once daily.      insulin lispro (HumaLOG) 100 unit/mL injection Inject under the skin 3 times a day with meals. Take as directed per insulin instructions.      patient supplied pump Patient Supplied Medication Pump - INSULIN Inject under the skin continuously.      tamsulosin (Flomax) 0.4 mg 24 hr capsule Take 1 capsule (0.4 mg) by mouth once daily.      [DISCONTINUED] amLODIPine (Norvasc) 2.5 mg tablet Take 1 tablet (2.5 mg) by mouth once daily. (Patient taking differently: Take 2 tablets (5 mg) by mouth once daily.) 90 tablet 0    [DISCONTINUED] pen needle, diabetic 32 gauge x 5/32\" needle 4 times a day.       No current facility-administered medications on file prior to visit.     Immunization History   Administered Date(s) Administered    Flu vaccine, quadrivalent, high-dose, preservative free, age 65y+ (FLUZONE) 09/17/2021, 10/16/2021, 09/21/2022    Influenza, High Dose Seasonal, Preservative Free 09/19/2016, 09/22/2017, 08/31/2018, 10/01/2019, 01/15/2020    Influenza, Seasonal, Quadrivalent, Adjuvanted 09/02/2020, 08/30/2023    " Influenza, seasonal, injectable, preservative free 10/05/2015    Influenza, trivalent, adjuvanted 08/22/2019    Novel influenza-H1N1-09, preservative-free 01/19/2010    Pfizer COVID-19 vaccine, Fall 2023, 12 years and older, (30mcg/0.3mL) 10/24/2023    Pfizer COVID-19 vaccine, bivalent, age 12 years and older (30 mcg/0.3 mL) 09/21/2022    Pfizer Gray Cap SARS-CoV-2 04/12/2022    Pfizer Purple Cap SARS-CoV-2 03/09/2021, 04/08/2021, 10/16/2021    Pneumococcal polysaccharide vaccine, 23-valent, age 2 years and older (PNEUMOVAX 23) 10/06/2014, 10/25/2020    RSV, 60 Years And Older (AREXVY) 11/10/2023    Zoster vaccine, recombinant, adult (SHINGRIX) 10/25/2020, 01/05/2021, 10/02/2023, 12/06/2023     Patient's medical history was reviewed and updated either before or during this encounter.  ASSESSMENT / PLAN:  Diagnoses and all orders for this visit:  Primary hypertension  -     amLODIPine (Norvasc) 5 mg tablet; Take 1 tablet (5 mg) by mouth once daily.  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  Will continue with amlodipine 5 mg  Patient follows up with endocrinologist and is on insulin pump.  Patient's hemoglobin A1c has been improving from before.    Follow-up in 3 months    Darwin Mims MD

## 2024-02-03 DIAGNOSIS — E10.319 TYPE 1 DIABETES MELLITUS WITH RETINOPATHY OF BOTH EYES WITHOUT MACULAR EDEMA, UNSPECIFIED RETINOPATHY SEVERITY (MULTI): ICD-10-CM

## 2024-02-03 RX ORDER — INSULIN LISPRO 100 [IU]/ML
INJECTION, SOLUTION INTRAVENOUS; SUBCUTANEOUS
Qty: 90 ML | Refills: 3 | Status: SHIPPED | OUTPATIENT
Start: 2024-02-03

## 2024-02-04 PROCEDURE — RXMED WILLOW AMBULATORY MEDICATION CHARGE

## 2024-02-08 ENCOUNTER — PHARMACY VISIT (OUTPATIENT)
Dept: PHARMACY | Facility: CLINIC | Age: 79
End: 2024-02-08
Payer: COMMERCIAL

## 2024-02-08 PROCEDURE — RXOTC WILLOW AMBULATORY OTC CHARGE

## 2024-02-29 NOTE — PROGRESS NOTES
Subjective   Lowell Mcneill is a 78 y.o. male.    Chief Complaint:  LOWELL MCNEILL is being seen for a six month follow-up of angina pectoris, coronary artery disease, dyslipidemia, dyspnea, hypertension, a prior myocardial infarction and a routine medication evaluation.     HPI  This is a 77 y/o male here today for a six month Cardiology follow up visit. He denies chest pain, shortness of breath, heart palpitations, or pedal edema. He continues to have memory issues and fatigue. Reviewed lab work results and current medications. He had a NM stress test in 2021- see report.     Objective   Physical Exam  This is a 77 y/o male here today for a Cardiology follow up visit.  JVP not elevated. Carotid impulses are 2+ without overlying bruit.   Chest exhibits fair to good air movement with completely clear breath sounds.  The cardiac rhythm is regular with no premature beats.   Normal S1 and S2. No gallop, murmur or rub, or click.   Abdomen is soft and benign without focal tenderness.   No peripheral edema. The pedal pulses are intact.     Lab Review:   Clinical Support on 12/05/2023   Component Date Value    POC HEMOGLOBIN A1c 12/05/2023 9 (A)      Assessment/Plan   1. Triple-vessel with unstable angina, 06/29/2005, [facility].  2. Multivessel PCI with drug eluting stent to 90% mid LCX stenosis and 80% mid RCA stenosis, 06/2005.  3. PCI drug eluting stent to 80% LAD stenosis, 08/02/2005, .  4. Status post small nontransmural MI, 04/18/2011. Please see previous office notes for review. At the time of repeat cardiac cath on 04/20/2011, the patient was found to have a very lengthy LAD that extended around the LV apex with only 20% to 30% late proximal luminal narrowing, located after the first diagonal branch but prior to a widely patent previously deployed early mid vessel stent. The distal half of the LAD was normal. The nondominant LCX had a 20% to 30% short segmental late proximal stenosis at the site of  origin of the first marginal branch. Beyond that site, there was a widely patent mid vessel stent with the distal third of the LCX being normal including 2 terminal posterolateral branches. The dominant RCA had mild 10% to 20% mid vessel luminal irregularity within the previously deployed stent. Overall LV function was preserved. Had last stress testing in 2015. Clinically, he is doing well, still participating in martial arts. His current therapy will be kept unchanged which includes in part aspirin 81 mg daily, Plavix 75 mg daily, carvedilol 25 mg twice a day, and benazepril no 10 mg daily. Recheck office in 6 months with exercise nuclear stress test and echo as discussed.  Patient  Doing well without anginal symptoms.  His last nuclear stress test on 6/14/2021 was normal and will need to be repeated in approximately 1 to 2 years before he reaches 81 years old the patient last lipid panel 4/8/2022 includes cholesterol 93 LDL 31 triglyceride 53.  5. Hypertension. Blood pressure today is adequate. Will continue benazepril to 10 mg daily.  The patient blood pressure evidently was elevated recently on 12/25/2023.  He had a home blood pressure of 219/93.  He was seen by his primary care physician on amlodipine 2.5 mg he was started and then subsequently increased to 5 mg daily.  Blood pressures are currently in the 130s over 60s range.  He has no unusual chest pain.  Continue present therapy.  Lab work ordered by his primary care physician includes a CBC CMP glycohemoglobin lipid panel.  6. Low-grade carotid vascular disease. Carotid ultrasound in 08/2012 showed less than 40% stenosis bilaterally with small calcific heterogeneous plaque.  7. Insulin-dependent diabetes. The patient did have lab work performed on 03/20/2017 which included a normal SMA panel except for glucose of 207. His glycohemoglobin was 9.8%. The CBC was normal. His thyroid parameters were normal range with TSH 1.48. Will have follow-up fasting ab  work.  8. Diabetic retinopathy.  9. Hyperlipidemia. Continue atorvastatin 80 mg daily. The patient did have a fasting lipid panel on 11/2019 including cholesterol 181 LDL 99 HDL 58 triglyceride 120. Stopped Zetia. Will obtain labs from Dr Vazquez.  The patient's last lipid panel from 4/8/2022 was excellent cholesterol 93 triglyceride 53 LDL 31.  10. Prostate cancer brachial therapy.  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 The patient injured his right knee and has been receiving cortisone injections by orthopedics.  12. Vitamin D deficiency. Vitamin D level was only 8 when checked on 03/20/2017 and he is now on replacement.

## 2024-03-01 ENCOUNTER — HOSPITAL ENCOUNTER (OUTPATIENT)
Dept: CARDIOLOGY | Facility: CLINIC | Age: 79
Discharge: HOME | End: 2024-03-01
Payer: MEDICARE

## 2024-03-01 ENCOUNTER — OFFICE VISIT (OUTPATIENT)
Dept: CARDIOLOGY | Facility: CLINIC | Age: 79
End: 2024-03-01
Payer: MEDICARE

## 2024-03-01 VITALS
DIASTOLIC BLOOD PRESSURE: 64 MMHG | HEIGHT: 70 IN | OXYGEN SATURATION: 98 % | BODY MASS INDEX: 28.24 KG/M2 | HEART RATE: 70 BPM | SYSTOLIC BLOOD PRESSURE: 142 MMHG | WEIGHT: 197.25 LBS

## 2024-03-01 VITALS — HEIGHT: 71 IN | BODY MASS INDEX: 27.58 KG/M2 | WEIGHT: 197 LBS

## 2024-03-01 DIAGNOSIS — I10 HYPERTENSION, UNSPECIFIED TYPE: ICD-10-CM

## 2024-03-01 DIAGNOSIS — I21.4: ICD-10-CM

## 2024-03-01 DIAGNOSIS — I25.118 CORONARY ARTERY DISEASE OF NATIVE ARTERY OF NATIVE HEART WITH STABLE ANGINA PECTORIS (CMS-HCC): Primary | ICD-10-CM

## 2024-03-01 DIAGNOSIS — I10 ESSENTIAL (PRIMARY) HYPERTENSION: ICD-10-CM

## 2024-03-01 PROCEDURE — 1159F MED LIST DOCD IN RCRD: CPT | Performed by: INTERNAL MEDICINE

## 2024-03-01 PROCEDURE — 1036F TOBACCO NON-USER: CPT | Performed by: INTERNAL MEDICINE

## 2024-03-01 PROCEDURE — 3078F DIAST BP <80 MM HG: CPT | Performed by: INTERNAL MEDICINE

## 2024-03-01 PROCEDURE — 1126F AMNT PAIN NOTED NONE PRSNT: CPT | Performed by: INTERNAL MEDICINE

## 2024-03-01 PROCEDURE — 93306 TTE W/DOPPLER COMPLETE: CPT | Performed by: INTERNAL MEDICINE

## 2024-03-01 PROCEDURE — 3077F SYST BP >= 140 MM HG: CPT | Performed by: INTERNAL MEDICINE

## 2024-03-01 PROCEDURE — 99214 OFFICE O/P EST MOD 30 MIN: CPT | Performed by: INTERNAL MEDICINE

## 2024-03-01 PROCEDURE — 93306 TTE W/DOPPLER COMPLETE: CPT

## 2024-03-01 RX ORDER — BENAZEPRIL HYDROCHLORIDE 10 MG/1
10 TABLET ORAL DAILY
Qty: 30 TABLET | Refills: 11 | Status: SHIPPED | OUTPATIENT
Start: 2024-03-01

## 2024-03-01 ASSESSMENT — ENCOUNTER SYMPTOMS
OCCASIONAL FEELINGS OF UNSTEADINESS: 0
LOSS OF SENSATION IN FEET: 0
DEPRESSION: 0

## 2024-03-01 ASSESSMENT — PAIN SCALES - GENERAL: PAINLEVEL: 0-NO PAIN

## 2024-03-01 NOTE — PATIENT INSTRUCTIONS
Continue medications  Labs per pcp future labs   Last labs done in 12/2023   Follow up in 6 months

## 2024-03-02 LAB
AORTIC VALVE PEAK VELOCITY: 1.56 M/S
AV PEAK GRADIENT: 9.7 MMHG
AVA (PEAK VEL): 2.31 CM2
EJECTION FRACTION APICAL 4 CHAMBER: 80.5
EJECTION FRACTION: 79 %
LEFT ATRIUM VOLUME AREA LENGTH INDEX BSA: 33.4 ML/M2
LEFT VENTRICLE INTERNAL DIMENSION DIASTOLE: 4.02 CM (ref 3.5–6)
LEFT VENTRICULAR OUTFLOW TRACT DIAMETER: 2.09 CM
MITRAL VALVE E/A RATIO: 0.51
MITRAL VALVE E/E' RATIO: 6.66
RIGHT VENTRICLE FREE WALL PEAK S': 10.44 CM/S
RIGHT VENTRICLE PEAK SYSTOLIC PRESSURE: 35.6 MMHG
TRICUSPID ANNULAR PLANE SYSTOLIC EXCURSION: 2.3 CM

## 2024-03-05 ENCOUNTER — CLINICAL SUPPORT (OUTPATIENT)
Dept: ENDOCRINOLOGY | Facility: CLINIC | Age: 79
End: 2024-03-05
Payer: MEDICARE

## 2024-03-05 VITALS
WEIGHT: 202.6 LBS | BODY MASS INDEX: 29.07 KG/M2 | HEART RATE: 72 BPM | DIASTOLIC BLOOD PRESSURE: 62 MMHG | SYSTOLIC BLOOD PRESSURE: 132 MMHG

## 2024-03-05 DIAGNOSIS — E78.2 MIXED HYPERLIPIDEMIA: ICD-10-CM

## 2024-03-05 DIAGNOSIS — E10.65 TYPE 1 DIABETES MELLITUS WITH HYPERGLYCEMIA (MULTI): Primary | ICD-10-CM

## 2024-03-05 DIAGNOSIS — I10 HYPERTENSION, UNSPECIFIED TYPE: ICD-10-CM

## 2024-03-05 LAB — POC HEMOGLOBIN A1C: 8.2 % (ref 4.2–6.5)

## 2024-03-05 PROCEDURE — 95251 CONT GLUC MNTR ANALYSIS I&R: CPT | Performed by: INTERNAL MEDICINE

## 2024-03-05 PROCEDURE — 83036 HEMOGLOBIN GLYCOSYLATED A1C: CPT | Performed by: INTERNAL MEDICINE

## 2024-03-05 PROCEDURE — 99214 OFFICE O/P EST MOD 30 MIN: CPT | Performed by: INTERNAL MEDICINE

## 2024-03-05 NOTE — PROGRESS NOTES
HPI     Previous pt of Dr. 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  Last A1c 9.0% - today 8.2%.   Pt is testing sugars 4+ times per day with Dexcom G6.  Pt is having low sugars 0-1 times/week. Pt is following a carb controlled diet and knows reasonable carb allowances - reports feeling confident in his carb counts and has been working on adding them to his pump when he eats. Pt is able to afford their medications. Pt is exercising, does martial arts, walks on treadmill daily.    Pump: Tandem tslim using control IQ with Dexcom G6     - had initial training with Amalia Nov 2023,  met with her a second time for additional training shortly after   Basal:  12a 1.2  4a 1.2  12p 1.5  ICR:   12a 1:8  4a 1:5  -changed from 1:6 last visit   12p 1:5  -changed from 1:6 last visit   ISF:   12a 30  4a 20  -changed from 25 last visit   12p 20  -changed from 25 last visit   Target: 110    -does not have smartphone compatible with pop,  pt manually downloaded pump at home in preparation for today's visit     30 days tandem source data reviewed & attached: 53% in range, 0% low, pattern: mid to high 200's overnight,  coming down low 100's on waking,  upper 100's / low 200's after lunch,  often into getting into high 200 to 300 range after dinner,  low 200's before bed   - Sugars tend to spike after most meals / carb entries and require multiple auto-boluses in order to get back into target range.     Previous insulin regimen: tresiba 23 units and humalog 1:4, plus isf 20>120.     Taking atorvastatin 80mg and zetia 10mg for lipids and tolerating.    Taking benazaprl 10mg,  carvedilol 25mg (1/2 bid) & amlodipine 5mg for htn.        Current Outpatient Medications:     amLODIPine (Norvasc) 5 mg tablet, Take 1 tablet (5 mg) by mouth once daily., Disp: 90 tablet, Rfl: 3    aspirin 81 mg EC tablet, Take 1 tablet (81 mg) by mouth once daily., Disp: , Rfl:      atorvastatin (Lipitor) 80 mg tablet, Take 1 tablet (80 mg) by mouth once daily., Disp: , Rfl:     benazepril (Lotensin) 10 mg tablet, TAKE 1 TABLET BY MOUTH EVERY DAY AS DIRECTED, Disp: 30 tablet, Rfl: 11    carvedilol (Coreg) 25 mg tablet, Take 0.5 tablets (12.5 mg) by mouth 2 times a day with meals., Disp: , Rfl:     clopidogrel (Plavix) 75 mg tablet, TAKE 1 TABLET BY MOUTH EVERY DAY, Disp: 90 tablet, Rfl: 3    ezetimibe (Zetia) 10 mg tablet, Take 1 tablet (10 mg) by mouth once daily., Disp: , Rfl:     insulin lispro (HumaLOG U-100 Insulin) 100 unit/mL injection, Up to 100 units daily in insulin pump., Disp: 90 mL, Rfl: 3    patient supplied pump Patient Supplied Medication Pump - INSULIN, Inject under the skin continuously., Disp: , Rfl:     tamsulosin (Flomax) 0.4 mg 24 hr capsule, Take 1 capsule (0.4 mg) by mouth once daily., Disp: , Rfl:     Allergies as of 03/05/2024    (No Known Allergies)       /62   Pulse 72   Wt 91.9 kg (202 lb 9.6 oz)   BMI 29.07 kg/m²     Labs:   Lab Results   Component Value Date    WBC 5.7 04/08/2022    NRBC 0 04/08/2022    RBC 4.60 04/08/2022    HGB 13.1 (L) 04/08/2022    HCT 38.6 (L) 04/08/2022     04/08/2022     Lab Results   Component Value Date    CALCIUM 9.3 07/13/2023    AST 21 10/14/2022    ALKPHOS 83 10/14/2022    BILITOT 0.4 10/14/2022    PROT 6.6 10/14/2022    ALBUMIN 4.0 10/14/2022    GLOB 2.6 10/14/2022    AGR 1.5 10/14/2022     07/13/2023    K 4.6 07/13/2023     07/13/2023    CO2 23 (L) 07/13/2023    ANIONGAP 13 07/13/2023    BUN 20 07/13/2023    CREATININE 1.3 07/13/2023    UREACREAUR 15.4 07/13/2023    GLUCOSE 126 (H) 07/13/2023    ALT 26 10/14/2022    EGFR 57 07/13/2023     Lab Results   Component Value Date    CHOL 93 (L) 04/08/2022    TRIG 55 04/08/2022    HDL 51 04/08/2022    LDLCALC 31 (L) 04/08/2022     Lab Results   Component Value Date    MICROALBCREA 52.3 (H) 04/08/2022     Lab Results   Component Value Date    TSH 1.33 04/19/2021      Lab Results   Component Value Date    [id number] 620 04/19/2021     Lab Results   Component Value Date    HGBA1C 8.2 (A) 03/05/2024         Assessment/Plan   1. Type 1 diabetes mellitus with hyperglycemia (CMS/[facility])    Suggest turning sleep schedule OFF for now,  will benefit from control iq autoboluses 24/7 as often high postprandial dinner meal.    Could revisit consider turning back on in a future visit    Intensify ICR 12p from 1:5 to now 1:4.5   May need new time segment added to intensify specifically dinner meal further,  reassess next visit     - POCT glycosylated hemoglobin (Hb A1C) manually resulted    2. Mixed hyperlipidemia  On statin,  tolerating  Update fasting labs prior to summer 2024 visit,  has active order from pcp     3. Hypertension, unspecified type  At target on therapy       Follow up: 3mon Rupali     -labs/tests/notes reviewed  -reviewed and counseled patient on medication monitoring and side effects    Treatment and plan discussed with Dr. Martinez.  JINNY Ramirez, PharmD, BC-ADM, [facility].     Medical Decision Making  Complexity of problem: Chronic illness of diabetes mellitus uncontrolled, progressing  Data analyzed and reviewed: Reviewed prior notes, blood glucose data, labs including HgbA1c, lipids, serum chemistries.  Ordered tests.   Risk of complications and morbidities: Is definite because of use of insulin and risk of hypoglycemia.  Prescription medications reviewed and modifications made.  Compliance assessed.  Addressed social determinants of health including food insecurity.

## 2024-03-05 NOTE — PROGRESS NOTES
I, Dr Te Martinez, have reviewed this progress note, medication list, vital signs, any pertinent lab values, and any CGM data if present with the [facility] and  face to face during this visit today. This note reflects the treatment plan that was made under my direction after reviewing the above mentioned elements while face to face with the patient and CDE.  I personally answered and addressed any questions and concerns the patient had during the visit today.  The CDE entered the data in this note under my direction and I personally reviewed it, signed any lab or medication orders that I instructed to be completed. I am the billing provider for this visit and the level of service was determined by my involvement in the Medical Decision Making Component of this visit while face to face with the patient.    Electronically signed by Te Martinez MD on 3/5/24 at 3:27 PM.

## 2024-03-05 NOTE — PATIENT INSTRUCTIONS
Changes made to pump settings:  - carb ratio 12p from 1:5 to now 1:4.5  - sleep schedule turned OFF    Go for fasting blood work before next visit     Keep up the great work!

## 2024-03-16 DIAGNOSIS — E78.5 HYPERLIPIDEMIA, UNSPECIFIED: ICD-10-CM

## 2024-03-18 RX ORDER — ATORVASTATIN CALCIUM 80 MG/1
80 TABLET, FILM COATED ORAL DAILY
Qty: 90 TABLET | Refills: 3 | Status: SHIPPED | OUTPATIENT
Start: 2024-03-18

## 2024-04-03 ENCOUNTER — APPOINTMENT (OUTPATIENT)
Dept: OPHTHALMOLOGY | Facility: CLINIC | Age: 79
End: 2024-04-03
Payer: MEDICARE

## 2024-05-02 ENCOUNTER — PHARMACY VISIT (OUTPATIENT)
Dept: PHARMACY | Facility: CLINIC | Age: 79
End: 2024-05-02
Payer: COMMERCIAL

## 2024-05-02 PROCEDURE — RXMED WILLOW AMBULATORY MEDICATION CHARGE

## 2024-05-29 ENCOUNTER — APPOINTMENT (OUTPATIENT)
Dept: CARDIOLOGY | Facility: CLINIC | Age: 79
End: 2024-05-29
Payer: MEDICARE

## 2024-06-05 ENCOUNTER — APPOINTMENT (OUTPATIENT)
Dept: ENDOCRINOLOGY | Facility: CLINIC | Age: 79
End: 2024-06-05
Payer: MEDICARE

## 2024-06-21 ENCOUNTER — APPOINTMENT (OUTPATIENT)
Dept: ENDOCRINOLOGY | Facility: CLINIC | Age: 79
End: 2024-06-21
Payer: MEDICARE

## 2024-06-21 VITALS — WEIGHT: 196.4 LBS | BODY MASS INDEX: 28.18 KG/M2 | SYSTOLIC BLOOD PRESSURE: 117 MMHG | DIASTOLIC BLOOD PRESSURE: 70 MMHG

## 2024-06-21 DIAGNOSIS — I10 HYPERTENSION, UNSPECIFIED TYPE: ICD-10-CM

## 2024-06-21 DIAGNOSIS — E78.2 MIXED HYPERLIPIDEMIA: ICD-10-CM

## 2024-06-21 DIAGNOSIS — E10.65 TYPE 1 DIABETES MELLITUS WITH HYPERGLYCEMIA (MULTI): Primary | ICD-10-CM

## 2024-06-21 LAB — POC HEMOGLOBIN A1C: 9.2 % (ref 4.2–6.5)

## 2024-06-21 PROCEDURE — 83036 HEMOGLOBIN GLYCOSYLATED A1C: CPT | Performed by: INTERNAL MEDICINE

## 2024-06-21 PROCEDURE — 99214 OFFICE O/P EST MOD 30 MIN: CPT | Performed by: INTERNAL MEDICINE

## 2024-06-21 RX ORDER — GLUCAGON 3 MG/1
POWDER NASAL
Qty: 2 EACH | Refills: 1 | Status: SHIPPED | OUTPATIENT
Start: 2024-06-21

## 2024-06-21 NOTE — PROGRESS NOTES
HPI     Previous pt of Dr. 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  Last A1c 8.2% - today 9.2%.     Pt is testing sugars 4+ times per day with Dexcom G7.  Pt is having low sugars 0-1 times/week. Pt is following a carb controlled diet and knows reasonable carb allowances. Pt is able to afford their medications. Pt is exercising, does martial arts, walks on treadmill daily.    Pump: Tandem tslim using control IQ with Dexcom G7    -initial training w/ Amalia Nov 2023,  met a second time for additional training shortly after     Basal:   12a 1.2  4a 1.2  12p 1.5  ICR:   12a 1:8  4a 1:5  12p 1:4.5  -changed last visit from 1:5    ISF:   12a 30  4a 20    12p 20      -does not have smartphone compatible with pop though wife does.  Plan to connect to her phone     -having issues with pump last weekend,  attempted to bring down sugar w/ manual injection & resulted in severe low requiring 911 assistance at home    -got new replacement tslim pump, set up w/ Amalia on Monday this week.  Unfortunately unable to download data today d/t technical issues.       Taking atorvastatin 80mg and zetia 10mg for lipids and tolerating.    Taking benazaprl 10mg,  carvedilol 25mg (1/2 bid) & amlodipine 5mg for htn.        Current Outpatient Medications:     amLODIPine (Norvasc) 5 mg tablet, Take 1 tablet (5 mg) by mouth once daily., Disp: 90 tablet, Rfl: 3    aspirin 81 mg EC tablet, Take 1 tablet (81 mg) by mouth once daily., Disp: , Rfl:     atorvastatin (Lipitor) 80 mg tablet, TAKE 1 TABLET BY MOUTH EVERY DAY, Disp: 90 tablet, Rfl: 3    benazepril (Lotensin) 10 mg tablet, TAKE 1 TABLET BY MOUTH EVERY DAY AS DIRECTED, Disp: 30 tablet, Rfl: 11    carvedilol (Coreg) 25 mg tablet, Take 0.5 tablets (12.5 mg) by mouth 2 times a day with meals., Disp: , Rfl:     clopidogrel (Plavix) 75 mg tablet, TAKE 1 TABLET BY MOUTH EVERY DAY, Disp: 90 tablet, Rfl: 3     ezetimibe (Zetia) 10 mg tablet, Take 1 tablet (10 mg) by mouth once daily., Disp: , Rfl:     insulin lispro (HumaLOG U-100 Insulin) 100 unit/mL injection, Up to 100 units daily in insulin pump., Disp: 90 mL, Rfl: 3    patient supplied pump Patient Supplied Medication Pump - INSULIN, Inject under the skin continuously., Disp: , Rfl:     tamsulosin (Flomax) 0.4 mg 24 hr capsule, Take 1 capsule (0.4 mg) by mouth once daily., Disp: , Rfl:     Allergies as of 06/21/2024    (No Known Allergies)       /70 Comment: home bp  Wt 89.1 kg (196 lb 6.4 oz)   BMI 28.18 kg/m²     Labs:   Lab Results   Component Value Date    WBC 5.7 04/08/2022    NRBC 0 04/08/2022    RBC 4.60 04/08/2022    HGB 13.1 (L) 04/08/2022    HCT 38.6 (L) 04/08/2022     04/08/2022     Lab Results   Component Value Date    CALCIUM 9.3 07/13/2023    AST 21 10/14/2022    ALKPHOS 83 10/14/2022    BILITOT 0.4 10/14/2022    PROT 6.6 10/14/2022    ALBUMIN 4.0 10/14/2022    GLOB 2.6 10/14/2022    AGR 1.5 10/14/2022     07/13/2023    K 4.6 07/13/2023     07/13/2023    CO2 23 (L) 07/13/2023    ANIONGAP 13 07/13/2023    BUN 20 07/13/2023    CREATININE 1.3 07/13/2023    UREACREAUR 15.4 07/13/2023    GLUCOSE 126 (H) 07/13/2023    ALT 26 10/14/2022    EGFR 57 07/13/2023     Lab Results   Component Value Date    CHOL 93 (L) 04/08/2022    TRIG 55 04/08/2022    HDL 51 04/08/2022    LDLCALC 31 (L) 04/08/2022     Lab Results   Component Value Date    MICROALBCREA 52.3 (H) 04/08/2022     Lab Results   Component Value Date    TSH 1.33 04/19/2021     Lab Results   Component Value Date    [id number] 620 04/19/2021     Lab Results   Component Value Date    HGBA1C 9.2 (A) 06/21/2024         Assessment/Plan   1. Type 1 diabetes mellitus with hyperglycemia ([facility])    -A1c ordered & reviewed  -labs pending with pcp    -unable to connect/download new pump,  consulted Jose who will be discussing with tech support  -jose to meet pt in office next week on  Monday, the 24th at 12p to further troubleshoot issues  -will review pump data at that time once issues resolved    -reviewed newer glucagons given severe low,  pt/wife opted for Baqsimi.  Demonstrated proper admin using demo      2. Mixed hyperlipidemia  -on statin,  tolerating   -labs pending with pcp      Follow up: tbd after reviewing pump data    -labs/tests/notes reviewed  -reviewed and counseled patient on medication monitoring and side effects    Treatment and plan discussed with Dr. Martinez.  JINNY Ramirez, PharmD, [facility] [place], [facility].     Medical Decision Making  Complexity of problem: Chronic illness of diabetes mellitus uncontrolled, progressing  Data analyzed and reviewed: Reviewed prior notes, blood glucose data, labs including HgbA1c, lipids, serum chemistries.  Ordered tests.   Risk of complications and morbidities: Is definite because of use of insulin and risk of hypoglycemia.  Prescription medications reviewed and modifications made.  Compliance assessed.  Addressed social determinants of health including food insecurity.

## 2024-06-21 NOTE — PROGRESS NOTES
Attestation signed by Te Martinez MD on 6/21/24 at 3:33 PM.    I, Dr Te Martinez, have reviewed this progress note, medication list, vital signs, any pertinent lab values, and any CGM data if present with the [facility] and  face to face during this visit today. This note reflects the treatment plan that was made under my direction after reviewing the above mentioned elements while face to face with the patient and CDE.  I personally answered and addressed any questions and concerns the patient had during the visit today.  The CDE entered the data in this note under my direction and I personally reviewed it, signed any lab or medication orders that I instructed to be completed. I am the billing provider for this visit and the level of service was determined by my involvement in the Medical Decision Making Component of this visit while face to face with the patient.

## 2024-06-21 NOTE — PATIENT INSTRUCTIONS
Meeting with Amalia Monday, the 24th at 12p noon to troubleshoot pump     Baqsimi as needed for severe low blood sugar

## 2024-06-24 ENCOUNTER — TELEPHONE (OUTPATIENT)
Dept: ENDOCRINOLOGY | Facility: CLINIC | Age: 79
End: 2024-06-24
Payer: MEDICARE

## 2024-06-24 DIAGNOSIS — E10.65 TYPE 1 DIABETES MELLITUS WITH HYPERGLYCEMIA (MULTI): Primary | ICD-10-CM

## 2024-06-24 RX ORDER — GLUCAGON INJECTION, SOLUTION 1 MG/.2ML
INJECTION, SOLUTION SUBCUTANEOUS
Qty: 0.4 ML | Refills: 1 | Status: SHIPPED | OUTPATIENT
Start: 2024-06-24

## 2024-07-29 ENCOUNTER — APPOINTMENT (OUTPATIENT)
Dept: OPHTHALMOLOGY | Facility: CLINIC | Age: 79
End: 2024-07-29
Payer: MEDICARE

## 2024-07-29 DIAGNOSIS — H25.12 AGE-RELATED NUCLEAR CATARACT OF LEFT EYE: ICD-10-CM

## 2024-07-29 DIAGNOSIS — H40.003 GLAUCOMA SUSPECT OF BOTH EYES: Primary | ICD-10-CM

## 2024-07-29 PROCEDURE — 99214 OFFICE O/P EST MOD 30 MIN: CPT | Performed by: OPHTHALMOLOGY

## 2024-07-29 PROCEDURE — 92083 EXTENDED VISUAL FIELD XM: CPT | Performed by: OPHTHALMOLOGY

## 2024-07-29 ASSESSMENT — SLIT LAMP EXAM - LIDS
COMMENTS: DERMATOCHALASIS
COMMENTS: DERMATOCHALASIS

## 2024-07-29 ASSESSMENT — ENCOUNTER SYMPTOMS
EYES NEGATIVE: 0
NEUROLOGICAL NEGATIVE: 0
CARDIOVASCULAR NEGATIVE: 0
RESPIRATORY NEGATIVE: 0
ENDOCRINE NEGATIVE: 0
PSYCHIATRIC NEGATIVE: 0
GASTROINTESTINAL NEGATIVE: 0
HEMATOLOGIC/LYMPHATIC NEGATIVE: 0
CONSTITUTIONAL NEGATIVE: 0
MUSCULOSKELETAL NEGATIVE: 0
ALLERGIC/IMMUNOLOGIC NEGATIVE: 0

## 2024-07-29 ASSESSMENT — CONF VISUAL FIELD
OS_NORMAL: 1
OD_INFERIOR_TEMPORAL_RESTRICTION: 1
OS_SUPERIOR_NASAL_RESTRICTION: 0
OS_INFERIOR_TEMPORAL_RESTRICTION: 0
OD_SUPERIOR_TEMPORAL_RESTRICTION: 1
OS_SUPERIOR_TEMPORAL_RESTRICTION: 0
OD_INFERIOR_NASAL_RESTRICTION: 1
OD_SUPERIOR_NASAL_RESTRICTION: 1
OS_INFERIOR_NASAL_RESTRICTION: 0

## 2024-07-29 ASSESSMENT — TONOMETRY
IOP_METHOD: GOLDMANN APPLANATION
OS_IOP_MMHG: 23
OD_IOP_MMHG: PROSTHESIS

## 2024-07-29 ASSESSMENT — PACHYMETRY: OS_CT(UM): 486

## 2024-07-29 ASSESSMENT — EXTERNAL EXAM - LEFT EYE: OS_EXAM: NORMAL

## 2024-07-29 ASSESSMENT — VISUAL ACUITY
OS_SC: 20/20
OS_SC+: -1
METHOD: SNELLEN - LINEAR
OD_SC: PROSTHESIS

## 2024-07-29 ASSESSMENT — CUP TO DISC RATIO: OS_RATIO: 0.7

## 2024-07-29 ASSESSMENT — EXTERNAL EXAM - RIGHT EYE: OD_EXAM: NORMAL

## 2024-07-29 NOTE — PROGRESS NOTES
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 monitor           myopia with presbyopia, left eye:     Establish with Dr. Staton for mrx

## 2024-08-12 ENCOUNTER — LAB (OUTPATIENT)
Dept: LAB | Facility: LAB | Age: 79
End: 2024-08-12
Payer: MEDICARE

## 2024-08-12 DIAGNOSIS — E10.319 TYPE 1 DIABETES MELLITUS WITH RETINOPATHY OF BOTH EYES WITHOUT MACULAR EDEMA, UNSPECIFIED RETINOPATHY SEVERITY (MULTI): ICD-10-CM

## 2024-08-12 DIAGNOSIS — I10 PRIMARY HYPERTENSION: ICD-10-CM

## 2024-08-12 LAB
ALBUMIN SERPL-MCNC: 4.1 G/DL (ref 3.5–5)
ALP BLD-CCNC: 83 U/L (ref 35–125)
ALT SERPL-CCNC: 22 U/L (ref 5–40)
ANION GAP SERPL CALC-SCNC: 10 MMOL/L
AST SERPL-CCNC: 24 U/L (ref 5–40)
BILIRUB SERPL-MCNC: 0.4 MG/DL (ref 0.1–1.2)
BUN SERPL-MCNC: 14 MG/DL (ref 8–25)
CALCIUM SERPL-MCNC: 9.2 MG/DL (ref 8.5–10.4)
CHLORIDE SERPL-SCNC: 106 MMOL/L (ref 97–107)
CHOLEST SERPL-MCNC: 108 MG/DL (ref 133–200)
CHOLEST/HDLC SERPL: 1.7 {RATIO}
CO2 SERPL-SCNC: 24 MMOL/L (ref 24–31)
CREAT SERPL-MCNC: 1.3 MG/DL (ref 0.4–1.6)
EGFRCR SERPLBLD CKD-EPI 2021: 57 ML/MIN/1.73M*2
ERYTHROCYTE [DISTWIDTH] IN BLOOD BY AUTOMATED COUNT: 16.4 % (ref 11.5–14.5)
EST. AVERAGE GLUCOSE BLD GHB EST-MCNC: 209 MG/DL
GLUCOSE SERPL-MCNC: 109 MG/DL (ref 65–99)
HBA1C MFR BLD: 8.9 %
HCT VFR BLD AUTO: 36.6 % (ref 41–52)
HDLC SERPL-MCNC: 63 MG/DL
HGB BLD-MCNC: 11.9 G/DL (ref 13.5–17.5)
LDLC SERPL CALC-MCNC: 32 MG/DL (ref 65–130)
MCH RBC QN AUTO: 28.5 PG (ref 26–34)
MCHC RBC AUTO-ENTMCNC: 32.5 G/DL (ref 32–36)
MCV RBC AUTO: 88 FL (ref 80–100)
NRBC BLD-RTO: 0 /100 WBCS (ref 0–0)
PLATELET # BLD AUTO: 166 X10*3/UL (ref 150–450)
POTASSIUM SERPL-SCNC: 4.3 MMOL/L (ref 3.4–5.1)
PROT SERPL-MCNC: 6.4 G/DL (ref 5.9–7.9)
RBC # BLD AUTO: 4.17 X10*6/UL (ref 4.5–5.9)
SODIUM SERPL-SCNC: 140 MMOL/L (ref 133–145)
TRIGL SERPL-MCNC: 63 MG/DL (ref 40–150)
WBC # BLD AUTO: 6.1 X10*3/UL (ref 4.4–11.3)

## 2024-08-12 PROCEDURE — 36415 COLL VENOUS BLD VENIPUNCTURE: CPT

## 2024-08-12 PROCEDURE — 80061 LIPID PANEL: CPT

## 2024-08-12 PROCEDURE — 83036 HEMOGLOBIN GLYCOSYLATED A1C: CPT

## 2024-08-12 PROCEDURE — 85027 COMPLETE CBC AUTOMATED: CPT

## 2024-08-12 PROCEDURE — 80053 COMPREHEN METABOLIC PANEL: CPT

## 2024-08-16 ENCOUNTER — OFFICE VISIT (OUTPATIENT)
Dept: PRIMARY CARE | Facility: CLINIC | Age: 79
End: 2024-08-16
Payer: MEDICARE

## 2024-08-16 VITALS
OXYGEN SATURATION: 98 % | SYSTOLIC BLOOD PRESSURE: 114 MMHG | TEMPERATURE: 96.1 F | BODY MASS INDEX: 28.92 KG/M2 | WEIGHT: 202 LBS | DIASTOLIC BLOOD PRESSURE: 66 MMHG | HEART RATE: 66 BPM | HEIGHT: 70 IN

## 2024-08-16 DIAGNOSIS — N18.31 STAGE 3A CHRONIC KIDNEY DISEASE (MULTI): ICD-10-CM

## 2024-08-16 DIAGNOSIS — I25.118 CORONARY ARTERY DISEASE OF NATIVE ARTERY OF NATIVE HEART WITH STABLE ANGINA PECTORIS (CMS-HCC): ICD-10-CM

## 2024-08-16 DIAGNOSIS — E10.3292: ICD-10-CM

## 2024-08-16 DIAGNOSIS — E78.2 MIXED HYPERLIPIDEMIA: ICD-10-CM

## 2024-08-16 DIAGNOSIS — I50.9 HEART FAILURE, UNSPECIFIED HF CHRONICITY, UNSPECIFIED HEART FAILURE TYPE (MULTI): ICD-10-CM

## 2024-08-16 DIAGNOSIS — E10.65 TYPE 1 DIABETES MELLITUS WITH HYPERGLYCEMIA (MULTI): ICD-10-CM

## 2024-08-16 DIAGNOSIS — Z00.00 ROUTINE GENERAL MEDICAL EXAMINATION AT HEALTH CARE FACILITY: Primary | ICD-10-CM

## 2024-08-16 DIAGNOSIS — I10 PRIMARY HYPERTENSION: ICD-10-CM

## 2024-08-16 DIAGNOSIS — C61 MALIGNANT TUMOR OF PROSTATE (MULTI): ICD-10-CM

## 2024-08-16 PROCEDURE — 99214 OFFICE O/P EST MOD 30 MIN: CPT | Performed by: INTERNAL MEDICINE

## 2024-08-16 PROCEDURE — 1159F MED LIST DOCD IN RCRD: CPT | Performed by: INTERNAL MEDICINE

## 2024-08-16 PROCEDURE — G0439 PPPS, SUBSEQ VISIT: HCPCS | Performed by: INTERNAL MEDICINE

## 2024-08-16 PROCEDURE — 1123F ACP DISCUSS/DSCN MKR DOCD: CPT | Performed by: INTERNAL MEDICINE

## 2024-08-16 PROCEDURE — 3074F SYST BP LT 130 MM HG: CPT | Performed by: INTERNAL MEDICINE

## 2024-08-16 PROCEDURE — 90715 TDAP VACCINE 7 YRS/> IM: CPT | Performed by: INTERNAL MEDICINE

## 2024-08-16 PROCEDURE — 1124F ACP DISCUSS-NO DSCNMKR DOCD: CPT | Performed by: INTERNAL MEDICINE

## 2024-08-16 PROCEDURE — 3078F DIAST BP <80 MM HG: CPT | Performed by: INTERNAL MEDICINE

## 2024-08-16 PROCEDURE — 90471 IMMUNIZATION ADMIN: CPT | Performed by: INTERNAL MEDICINE

## 2024-08-16 PROCEDURE — 1125F AMNT PAIN NOTED PAIN PRSNT: CPT | Performed by: INTERNAL MEDICINE

## 2024-08-16 ASSESSMENT — PATIENT HEALTH QUESTIONNAIRE - PHQ9
SUM OF ALL RESPONSES TO PHQ9 QUESTIONS 1 AND 2: 0
2. FEELING DOWN, DEPRESSED OR HOPELESS: NOT AT ALL
1. LITTLE INTEREST OR PLEASURE IN DOING THINGS: NOT AT ALL

## 2024-08-16 ASSESSMENT — LIFESTYLE VARIABLES
SKIP TO QUESTIONS 9-10: 1
HAS A RELATIVE, FRIEND, DOCTOR, OR ANOTHER HEALTH PROFESSIONAL EXPRESSED CONCERN ABOUT YOUR DRINKING OR SUGGESTED YOU CUT DOWN: NO
SKIP TO QUESTIONS 9-10: 1
AUDIT-C TOTAL SCORE: 0
HAVE YOU OR SOMEONE ELSE BEEN INJURED AS A RESULT OF YOUR DRINKING: NO
AUDIT TOTAL SCORE: 0
HOW OFTEN DURING THE LAST YEAR HAVE YOU FAILED TO DO WHAT WAS NORMALLY EXPECTED FROM YOU BECAUSE OF DRINKING: NEVER
HOW OFTEN DURING THE LAST YEAR HAVE YOU FAILED TO DO WHAT WAS NORMALLY EXPECTED FROM YOU BECAUSE OF DRINKING: NEVER
HAS A RELATIVE, FRIEND, DOCTOR, OR ANOTHER HEALTH PROFESSIONAL EXPRESSED CONCERN ABOUT YOUR DRINKING OR SUGGESTED YOU CUT DOWN: NO
HOW OFTEN DO YOU HAVE SIX OR MORE DRINKS ON ONE OCCASION: NEVER
HOW OFTEN DURING THE LAST YEAR HAVE YOU FOUND THAT YOU WERE NOT ABLE TO STOP DRINKING ONCE YOU HAD STARTED: NEVER
HOW MANY STANDARD DRINKS CONTAINING ALCOHOL DO YOU HAVE ON A TYPICAL DAY: PATIENT DOES NOT DRINK
HOW OFTEN DURING THE LAST YEAR HAVE YOU FOUND THAT YOU WERE NOT ABLE TO STOP DRINKING ONCE YOU HAD STARTED: NEVER
HOW OFTEN DO YOU HAVE A DRINK CONTAINING ALCOHOL: NEVER
HOW OFTEN DO YOU HAVE A DRINK CONTAINING ALCOHOL: NEVER
HOW OFTEN DURING THE LAST YEAR HAVE YOU NEEDED AN ALCOHOLIC DRINK FIRST THING IN THE MORNING TO GET YOURSELF GOING AFTER A NIGHT OF HEAVY DRINKING: NEVER
HOW OFTEN DURING THE LAST YEAR HAVE YOU HAD A FEELING OF GUILT OR REMORSE AFTER DRINKING: NEVER
HOW OFTEN DURING THE LAST YEAR HAVE YOU BEEN UNABLE TO REMEMBER WHAT HAPPENED THE NIGHT BEFORE BECAUSE YOU HAD BEEN DRINKING: NEVER
HOW MANY STANDARD DRINKS CONTAINING ALCOHOL DO YOU HAVE ON A TYPICAL DAY: PATIENT DOES NOT DRINK
AUDIT-C TOTAL SCORE: 0
HOW OFTEN DURING THE LAST YEAR HAVE YOU BEEN UNABLE TO REMEMBER WHAT HAPPENED THE NIGHT BEFORE BECAUSE YOU HAD BEEN DRINKING: NEVER
HAVE YOU OR SOMEONE ELSE BEEN INJURED AS A RESULT OF YOUR DRINKING: NO
HOW OFTEN DO YOU HAVE SIX OR MORE DRINKS ON ONE OCCASION: NEVER
HOW OFTEN DURING THE LAST YEAR HAVE YOU NEEDED AN ALCOHOLIC DRINK FIRST THING IN THE MORNING TO GET YOURSELF GOING AFTER A NIGHT OF HEAVY DRINKING: NEVER
AUDIT TOTAL SCORE: 0
HOW OFTEN DURING THE LAST YEAR HAVE YOU HAD A FEELING OF GUILT OR REMORSE AFTER DRINKING: NEVER

## 2024-08-16 ASSESSMENT — PAIN SCALES - GENERAL: PAINLEVEL: 5

## 2024-08-16 ASSESSMENT — ENCOUNTER SYMPTOMS
DEPRESSION: 0
LOSS OF SENSATION IN FEET: 0
OCCASIONAL FEELINGS OF UNSTEADINESS: 0

## 2024-08-17 PROCEDURE — RXMED WILLOW AMBULATORY MEDICATION CHARGE

## 2024-08-19 ENCOUNTER — PHARMACY VISIT (OUTPATIENT)
Dept: PHARMACY | Facility: CLINIC | Age: 79
End: 2024-08-19
Payer: COMMERCIAL

## 2024-08-19 PROCEDURE — RXOTC WILLOW AMBULATORY OTC CHARGE

## 2024-08-25 PROBLEM — S86.111A STRAIN OF GASTROCNEMIUS MUSCLE, RIGHT, INITIAL ENCOUNTER: Status: RESOLVED | Noted: 2023-08-25 | Resolved: 2024-08-25

## 2024-08-25 NOTE — PROGRESS NOTES
[facility]: [facility]  PROGRESS NOTE      Lowell Mcneill is a 78 y.o. male that is being seen  today for cpe.  Subjective   Pt. Is being seen for annual physical exam.Pt. has been doing well.Advance directives discussed with patient .  Patient is full code and he makes his own medical decisions.  Denies any hospitalisation or urgent care visit.  Previous Labs reviewed .  Pt. Is upto date on screening exams and vaccination  Denies any falls or hospitalisation.     Patient is a 78-year-old male with history of type 1 diabetes, hypertension, hyperlipidemia, history of prostate cancer in remission who is being seen for annual physical examination as well as for follow-up.  Patient had his blood work done.  Patient hemoglobin A1c has improved from before.  Patient has insulin pump.  Earlier patient's pump was not working and it has been changed.  Patient does follow-up with the endocrinologist.  Denies any significant complaints.  Other labs reviewed and have been stable.      ROS  Negative for fever or chills  Negative for sore throat, ear pain, nasal discharge  Negative for cough, shortness of breath or wheezing  Negative for chest pain, palpitations, swelling of legs  Negative for abdominal pain, constipation, diarrhea, blood in the stools  Negative for urinary complaints  Negative for headache, dizziness, weakness or numbness  Negative for joint pain  Negative for depression or anxiety  All other systems reviewed and were negative   Vitals:    08/16/24 1106   BP: 114/66   Pulse: 66   Temp: 35.6 °C (96.1 °F)   SpO2: 98%      Vitals:    08/16/24 1106   Weight: 91.6 kg (202 lb)     Body mass index is 28.98 kg/m².  Physical Exam  Constitutional: Patient does not appear to be in any acute distress  Head and Face: NCAT  Eyes: Normal external exam, EOMI  ENT: Normal external inspection of ears and nose. Oropharynx normal.  Cardiovascular: RRR, S1/S2, no murmurs, rubs, or gallops, radial pulses +2,  no edema of extremities  Pulmonary: CTAB, no respiratory distress.  Abdomen: +BS, soft, non-tender, nondistended, no guarding or rebound, no masses noted  MSK: No joint swelling, normal movements of all extremities. Range of motion- normal.  Skin- No lesions, contusions, or erythema.  Peripheral puslses palpable bilaterally 2+  Neuro: AAO X3, Cranial nerves 2-12 grossly intact,DTR 2+ in all 4 limbs   Psychiatric: Judgment intact. Appropriate mood and behavior    LABS   [unfilled]  Lab Results   Component Value Date    GLUCOSE 109 (H) 08/12/2024    CALCIUM 9.2 08/12/2024     08/12/2024    K 4.3 08/12/2024    CO2 24 08/12/2024     08/12/2024    BUN 14 08/12/2024    CREATININE 1.30 08/12/2024     Lab Results   Component Value Date    ALT 22 08/12/2024    AST 24 08/12/2024    ALKPHOS 83 08/12/2024    BILITOT 0.4 08/12/2024     Lab Results   Component Value Date    WBC 6.1 08/12/2024    HGB 11.9 (L) 08/12/2024    HCT 36.6 (L) 08/12/2024    MCV 88 08/12/2024     08/12/2024     Lab Results   Component Value Date    CHOL 108 (L) 08/12/2024    CHOL 93 (L) 04/08/2022    CHOL 96 04/19/2021     Lab Results   Component Value Date    HDL 63.0 08/12/2024    HDL 51 04/08/2022    HDL 49.7 04/19/2021     Lab Results   Component Value Date    LDLCALC 32 (L) 08/12/2024    LDLCALC 31 (L) 04/08/2022    LDLCALC 41 (L) 09/23/2020     Lab Results   Component Value Date    TRIG 63 08/12/2024    TRIG 55 04/08/2022    TRIG 66 04/19/2021     Lab Results   Component Value Date    HGBA1C 8.9 (H) 08/12/2024     Other labs not included in the list above were reviewed either before or during this encounter.    History    Past Medical History:   Diagnosis Date    CAD (coronary artery disease)     Diabetes mellitus type 1 (Multi)     Diabetic retinopathy (Multi)     Disorder 08/25/2023    HTN (hypertension)     Hypercholesteremia     Pain in scrotum 12/07/2023    Personal history of other diseases of the nervous system and sense  organs     History of cataract    Presence of artificial eye 08/14/2018    Eye globe prosthesis    Prostate cancer (Multi)      Past Surgical History:   Procedure Laterality Date    OTHER SURGICAL HISTORY  09/11/2018    Globe Enucleation Right     No family history on file.  No Known Allergies  Current Outpatient Medications on File Prior to Visit   Medication Sig Dispense Refill    amLODIPine (Norvasc) 5 mg tablet Take 1 tablet (5 mg) by mouth once daily. 90 tablet 3    aspirin 81 mg EC tablet Take 1 tablet (81 mg) by mouth once daily.      atorvastatin (Lipitor) 80 mg tablet TAKE 1 TABLET BY MOUTH EVERY DAY 90 tablet 3    benazepril (Lotensin) 10 mg tablet TAKE 1 TABLET BY MOUTH EVERY DAY AS DIRECTED 30 tablet 11    carvedilol (Coreg) 25 mg tablet Take 0.5 tablets (12.5 mg) by mouth 2 times daily (morning and late afternoon).      clopidogrel (Plavix) 75 mg tablet TAKE 1 TABLET BY MOUTH EVERY DAY 90 tablet 3    ezetimibe (Zetia) 10 mg tablet Take 1 tablet (10 mg) by mouth once daily.      Gvoke HypoPen 2-Pack 1 mg/0.2 mL auto-injector As directed as needed for severe low blood sugar 0.4 mL 1    insulin lispro (HumaLOG U-100 Insulin) 100 unit/mL injection Up to 100 units daily in insulin pump. 90 mL 3    Baqsimi 3 mg/actuation spray,non-aerosol As directed as needed for severe low blood sugar 2 each 1    tamsulosin (Flomax) 0.4 mg 24 hr capsule Take 1 capsule (0.4 mg) by mouth once daily.       No current facility-administered medications on file prior to visit.     Immunization History   Administered Date(s) Administered    Flu vaccine, quadrivalent, high-dose, preservative free, age 65y+ (FLUZONE) 09/17/2021, 10/16/2021, 09/21/2022    Flu vaccine, trivalent, preservative free, HIGH-DOSE, age 65y+ (Fluzone) 09/19/2016, 09/22/2017, 08/31/2018, 10/01/2019, 01/15/2020    Flu vaccine, trivalent, preservative free, age 6 months and greater (Fluarix/Fluzone/Flulaval) 10/05/2015    Influenza, Seasonal, Quadrivalent,  Adjuvanted 09/02/2020, 08/30/2023    Influenza, seasonal, injectable 10/06/2014    Influenza, trivalent, adjuvanted 08/22/2019    Novel influenza-H1N1-09, preservative-free 01/19/2010    Pfizer COVID-19 vaccine, Fall 2023, 12 years and older, (30mcg/0.3mL) 10/24/2023    Pfizer COVID-19 vaccine, bivalent, age 12 years and older (30 mcg/0.3 mL) 09/21/2022    Pfizer Gray Cap SARS-CoV-2 04/12/2022    Pfizer Purple Cap SARS-CoV-2 03/09/2021, 04/08/2021, 10/16/2021    Pneumococcal polysaccharide vaccine, 23-valent, age 2 years and older (PNEUMOVAX 23) 10/06/2014, 10/25/2020    RSV, 60 Years And Older (AREXVY) 11/10/2023    Tdap vaccine, age 7 year and older (BOOSTRIX, ADACEL) 08/16/2024    Zoster vaccine, recombinant, adult (SHINGRIX) 10/25/2020, 01/05/2021, 10/02/2023, 12/06/2023     Patient's medical history was reviewed and updated either before or during this encounter.  ASSESSMENT / PLAN:  Diagnoses and all orders for this visit:  Routine general medical examination at health care facility  Coronary artery disease of native artery of native heart with stable angina pectoris (CMS-HCC)  Mixed hyperlipidemia  Primary hypertension  Heart failure, unspecified HF chronicity, unspecified heart failure type (Multi)  Type 1 diabetes mellitus with hyperglycemia (Multi)  Background diabetic retinopathy of left eye determined by examination associated with type 1 diabetes mellitus (Multi)  Stage 3a chronic kidney disease (Multi)  Malignant tumor of prostate (Multi)  Other orders  -     Tdap vaccine, age 7 years and older  (BOOSTRIX)    Patient is being seen for annual physical examination and follow-up.  Patient lab work reviewed and has been improving.  Patient insulin pump has been changed and his sugars have been better controlled.  Patient cholesterol levels have been stable.  Patient does follow-up with endocrinologist as well as follows up with the ophthalmologist for retinopathy.  Denies any significant complaints with  the medications.      Darwin Mims MD

## 2024-08-30 DIAGNOSIS — I10 ESSENTIAL (PRIMARY) HYPERTENSION: ICD-10-CM

## 2024-08-30 DIAGNOSIS — E10.65 TYPE 1 DIABETES MELLITUS WITH HYPERGLYCEMIA (MULTI): Primary | ICD-10-CM

## 2024-08-30 RX ORDER — CARVEDILOL 25 MG/1
25 TABLET ORAL 2 TIMES DAILY
Qty: 180 TABLET | Refills: 3 | Status: SHIPPED | OUTPATIENT
Start: 2024-08-30

## 2024-08-30 RX ORDER — EZETIMIBE 10 MG/1
10 TABLET ORAL DAILY
Qty: 90 TABLET | Refills: 3 | Status: SHIPPED | OUTPATIENT
Start: 2024-08-30

## 2024-09-06 ENCOUNTER — OFFICE VISIT (OUTPATIENT)
Dept: CARDIOLOGY | Facility: CLINIC | Age: 79
End: 2024-09-06
Payer: MEDICARE

## 2024-09-06 VITALS
SYSTOLIC BLOOD PRESSURE: 114 MMHG | HEART RATE: 64 BPM | DIASTOLIC BLOOD PRESSURE: 56 MMHG | HEIGHT: 70 IN | OXYGEN SATURATION: 98 % | BODY MASS INDEX: 29.33 KG/M2 | WEIGHT: 204.9 LBS

## 2024-09-06 DIAGNOSIS — I25.118 CORONARY ARTERY DISEASE OF NATIVE ARTERY OF NATIVE HEART WITH STABLE ANGINA PECTORIS (CMS-HCC): Primary | ICD-10-CM

## 2024-09-06 PROCEDURE — 1159F MED LIST DOCD IN RCRD: CPT | Performed by: INTERNAL MEDICINE

## 2024-09-06 PROCEDURE — 3074F SYST BP LT 130 MM HG: CPT | Performed by: INTERNAL MEDICINE

## 2024-09-06 PROCEDURE — 3078F DIAST BP <80 MM HG: CPT | Performed by: INTERNAL MEDICINE

## 2024-09-06 PROCEDURE — 99214 OFFICE O/P EST MOD 30 MIN: CPT | Performed by: INTERNAL MEDICINE

## 2024-09-06 PROCEDURE — 1126F AMNT PAIN NOTED NONE PRSNT: CPT | Performed by: INTERNAL MEDICINE

## 2024-09-06 PROCEDURE — 1160F RVW MEDS BY RX/DR IN RCRD: CPT | Performed by: INTERNAL MEDICINE

## 2024-09-06 ASSESSMENT — PAIN SCALES - GENERAL: PAINLEVEL: 0-NO PAIN

## 2024-09-06 ASSESSMENT — COLUMBIA-SUICIDE SEVERITY RATING SCALE - C-SSRS
1. IN THE PAST MONTH, HAVE YOU WISHED YOU WERE DEAD OR WISHED YOU COULD GO TO SLEEP AND NOT WAKE UP?: NO
6. HAVE YOU EVER DONE ANYTHING, STARTED TO DO ANYTHING, OR PREPARED TO DO ANYTHING TO END YOUR LIFE?: NO
2. HAVE YOU ACTUALLY HAD ANY THOUGHTS OF KILLING YOURSELF?: NO

## 2024-09-06 NOTE — PROGRESS NOTES
Subjective   Lowell Mcneill is a 78 y.o. male.    Chief Complaint:  LOWELL MCNEILL is being seen for a six month follow-up of angina pectoris, coronary artery disease, dyslipidemia, dyspnea, hypertension, a prior myocardial infarction and a routine medication evaluation.     HPI  This is a 79 y/o male here today for a six month Cardiology follow up visit. He denies chest pain, shortness of breath, heart palpitations, or pedal edema. He continues to have memory issues and fatigue. Reviewed lab work results and current medications. He had a NM stress test in 2021- see report.     Objective   Physical Exam  This is a 79 y/o male here today for a Cardiology follow up visit.  JVP not elevated. Carotid impulses are 2+ without overlying bruit.   Chest exhibits fair to good air movement with completely clear breath sounds.  The cardiac rhythm is regular with no premature beats.   Normal S1 and S2. No gallop, murmur or rub, or click.   Abdomen is soft and benign without focal tenderness.   No peripheral edema. The pedal pulses are intact.     Lab Review:   Lab on 08/12/2024   Component Date Value    WBC 08/12/2024 6.1     nRBC 08/12/2024 0.0     RBC 08/12/2024 4.17 (L)     Hemoglobin 08/12/2024 11.9 (L)     Hematocrit 08/12/2024 36.6 (L)     MCV 08/12/2024 88     MCH 08/12/2024 28.5     MCHC 08/12/2024 32.5     RDW 08/12/2024 16.4 (H)     Platelets 08/12/2024 166     Glucose 08/12/2024 109 (H)     Sodium 08/12/2024 140     Potassium 08/12/2024 4.3     Chloride 08/12/2024 106     Bicarbonate 08/12/2024 24     Urea Nitrogen 08/12/2024 14     Creatinine 08/12/2024 1.30     eGFR 08/12/2024 57 (L)     Calcium 08/12/2024 9.2     Albumin 08/12/2024 4.1     Alkaline Phosphatase 08/12/2024 83     Total Protein 08/12/2024 6.4     AST 08/12/2024 24     Bilirubin, Total 08/12/2024 0.4     ALT 08/12/2024 22     Anion Gap 08/12/2024 10     Hemoglobin A1C 08/12/2024 8.9 (H)     Estimated Average Glucose 08/12/2024 209     Cholesterol  08/12/2024 108 (L)     HDL-Cholesterol 08/12/2024 63.0     Cholesterol/HDL Ratio 08/12/2024 1.7     LDL Calculated 08/12/2024 32 (L)     Triglycerides 08/12/2024 63    Clinical Support on 06/21/2024   Component Date Value    POC HEMOGLOBIN A1c 06/21/2024 9.2 (A)      Assessment/Plan   1. Triple-vessel with unstable angina, 06/29/2005, [facility], operated by [facility].  2. Multivessel PCI with drug eluting stent to 90% mid LCX stenosis and 80% mid RCA stenosis, 06/2005.  3. PCI drug eluting stent to 80% LAD stenosis, 08/02/2005, .  4. Status post small nontransmural MI, 04/18/2011. Please see previous office notes for review. At the time of repeat cardiac cath on 04/20/2011, the patient was found to have a very lengthy LAD that extended around the LV apex with only 20% to 30% late proximal luminal narrowing, located after the first diagonal branch but prior to a widely patent previously deployed early mid vessel stent. The distal half of the LAD was normal. The nondominant LCX had a 20% to 30% short segmental late proximal stenosis at the site of origin of the first marginal branch. Beyond that site, there was a widely patent mid vessel stent with the distal third of the LCX being normal including 2 terminal posterolateral branches. The dominant RCA had mild 10% to 20% mid vessel luminal irregularity within the previously deployed stent. Overall LV function was preserved. Had last stress testing in 2015. Clinically, he is doing well, still participating in martial arts. His current therapy will be kept unchanged which includes in part aspirin 81 mg daily, Plavix 75 mg daily, carvedilol 25 mg twice a day, and benazepril no 10 mg daily. Recheck office in 6 months with exercise nuclear stress test and echo as discussed.  Doing well without anginal symptoms.  His last nuclear stress test on 6/14/2021 was normal and will need to be repeated in approximately 1 to 2 years before he reaches 81 years old the patient last lipid panel  4/8/2022 includes cholesterol 93 LDL 31 triglyceride 53.  The patient did have a echocardiogram 3/1/2024 which again demonstrates a preserved LV ejection fraction at 60 to 65% aortic valve sclerosis trace mitral valve regurgitation.  Will plan on a repeat nuclear stress test in 1 to 2 years.  5. Hypertension. Blood pressure today is adequate. Will continue benazepril to 10 mg daily.  The patient blood pressure evidently was elevated recently on 12/25/2023.  He had a home blood pressure of 219/93.  He was seen by his primary care physician on amlodipine 2.5 mg he was started and then subsequently increased to 5 mg daily.  Blood pressures are currently in the 130s over 60s range.  He has no unusual chest pain.  Continue present therapy.  Lab work ordered by his primary care physician includes a CBC CMP glycohemoglobin lipid panel.  6. Low-grade carotid vascular disease. Carotid ultrasound in 08/2012 showed less than 40% stenosis bilaterally with small calcific heterogeneous plaque.  7. Insulin-dependent diabetes. The patient did have lab work performed on 03/20/2017 which included a normal SMA panel except for glucose of 207. His glycohemoglobin was 9.8%. The CBC was normal. His thyroid parameters were normal range with TSH 1.48. Will have follow-up fasting ab work.  Lab work from 8/12/2024 includes a normal SMA panel except for creatinine of 1.30.  His glycohemoglobin was 8.9% although it is insulin pump had evidently malfunctioned and has been replaced.  His hematocrit was 36.6.  8. Diabetic retinopathy.  9. Hyperlipidemia. Continue atorvastatin 80 mg daily. The patient did have a fasting lipid panel on 11/2019 including cholesterol 181 LDL 99 HDL 58 triglyceride 120. Will obtain labs from Dr Vazquez.  The patient's last lipid panel from 4/8/2022 was excellent cholesterol 93 triglyceride 53 LDL 31.  Lab work from 8/12/2024 includes lipids with cholesterol 108 LDL 32 HDL 63 triglycerides 63.  He will remain on both the  atorvastatin 80 mg daily and ezetimibe 10 mg daily.  10. Prostate cancer brachial therapy.  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 The patient injured his right knee and has been receiving cortisone injections by orthopedics.  12. Vitamin D deficiency. Vitamin D level was only 8 when checked on 03/20/2017 and he is now on replacement.

## 2024-10-02 ENCOUNTER — APPOINTMENT (OUTPATIENT)
Dept: OPHTHALMOLOGY | Facility: CLINIC | Age: 79
End: 2024-10-02
Payer: MEDICARE

## 2024-10-02 DIAGNOSIS — H40.003 GLAUCOMA SUSPECT OF BOTH EYES: Primary | ICD-10-CM

## 2024-10-02 PROCEDURE — 99214 OFFICE O/P EST MOD 30 MIN: CPT | Performed by: OPHTHALMOLOGY

## 2024-10-02 RX ORDER — LATANOPROST 50 UG/ML
1 SOLUTION/ DROPS OPHTHALMIC NIGHTLY
Qty: 7.5 ML | Refills: 3 | Status: SHIPPED | OUTPATIENT
Start: 2024-10-02 | End: 2025-10-02

## 2024-10-02 ASSESSMENT — GONIOSCOPY
OS_TEMPORAL: D45F 1-2+
OS_SUPERIOR: D45F 1-2+
OS_INFERIOR: D45F 1-2+
OS_NASAL: D45F 1-2+

## 2024-10-02 ASSESSMENT — EXTERNAL EXAM - LEFT EYE: OS_EXAM: NORMAL

## 2024-10-02 ASSESSMENT — VISUAL ACUITY
OS_CC: 20/30
METHOD: SNELLEN - LINEAR

## 2024-10-02 ASSESSMENT — TONOMETRY
IOP_METHOD: GOLDMANN APPLANATION
OS_IOP_MMHG: 23

## 2024-10-02 ASSESSMENT — SLIT LAMP EXAM - LIDS
COMMENTS: DERMATOCHALASIS
COMMENTS: DERMATOCHALASIS

## 2024-10-02 ASSESSMENT — PACHYMETRY: OS_CT(UM): 486

## 2024-10-02 ASSESSMENT — EXTERNAL EXAM - RIGHT EYE: OD_EXAM: NORMAL

## 2024-10-02 NOTE — PROGRESS NOTES
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 monitor           myopia with presbyopia, left eye:     Establish with Dr. Staton for mrx

## 2024-11-22 ENCOUNTER — PHARMACY VISIT (OUTPATIENT)
Dept: PHARMACY | Facility: CLINIC | Age: 79
End: 2024-11-22
Payer: COMMERCIAL

## 2024-11-22 PROCEDURE — RXMED WILLOW AMBULATORY MEDICATION CHARGE

## 2024-11-26 DIAGNOSIS — I21.4 NON-ST ELEVATION (NSTEMI) MYOCARDIAL INFARCTION (MULTI): ICD-10-CM

## 2024-11-26 RX ORDER — CLOPIDOGREL BISULFATE 75 MG/1
75 TABLET ORAL DAILY
Qty: 90 TABLET | Refills: 3 | Status: SHIPPED | OUTPATIENT
Start: 2024-11-26

## 2024-12-10 DIAGNOSIS — I10 PRIMARY HYPERTENSION: ICD-10-CM

## 2024-12-10 DIAGNOSIS — I10 ESSENTIAL (PRIMARY) HYPERTENSION: ICD-10-CM

## 2024-12-11 RX ORDER — AMLODIPINE BESYLATE 5 MG/1
5 TABLET ORAL DAILY
Qty: 90 TABLET | Refills: 1 | Status: SHIPPED | OUTPATIENT
Start: 2024-12-11

## 2024-12-11 RX ORDER — BENAZEPRIL HYDROCHLORIDE 10 MG/1
10 TABLET ORAL DAILY
Qty: 90 TABLET | Refills: 3 | Status: SHIPPED | OUTPATIENT
Start: 2024-12-11

## 2024-12-13 ENCOUNTER — TELEPHONE (OUTPATIENT)
Dept: PRIMARY CARE | Facility: CLINIC | Age: 79
End: 2024-12-13
Payer: MEDICARE

## 2024-12-13 NOTE — TELEPHONE ENCOUNTER
Pt believes he has POTS from having covid 19.  Pt states he was reading an article and he has all the symptoms.  Such as lying down and getting dizzy when he gets up, he feels weak, and exhausted.  Please advice    [phone]

## 2025-01-07 ENCOUNTER — OFFICE VISIT (OUTPATIENT)
Dept: PRIMARY CARE | Facility: CLINIC | Age: 80
End: 2025-01-07
Payer: MEDICARE

## 2025-01-07 VITALS
HEART RATE: 71 BPM | BODY MASS INDEX: 30.78 KG/M2 | WEIGHT: 215 LBS | HEIGHT: 70 IN | OXYGEN SATURATION: 99 % | SYSTOLIC BLOOD PRESSURE: 148 MMHG | DIASTOLIC BLOOD PRESSURE: 64 MMHG | TEMPERATURE: 96.7 F

## 2025-01-07 DIAGNOSIS — E10.319 TYPE 1 DIABETES MELLITUS WITH RETINOPATHY OF BOTH EYES WITHOUT MACULAR EDEMA, UNSPECIFIED RETINOPATHY SEVERITY: ICD-10-CM

## 2025-01-07 DIAGNOSIS — R42 DIZZINESS: Primary | ICD-10-CM

## 2025-01-07 PROCEDURE — 1126F AMNT PAIN NOTED NONE PRSNT: CPT | Performed by: INTERNAL MEDICINE

## 2025-01-07 PROCEDURE — 3077F SYST BP >= 140 MM HG: CPT | Performed by: INTERNAL MEDICINE

## 2025-01-07 PROCEDURE — 99214 OFFICE O/P EST MOD 30 MIN: CPT | Performed by: INTERNAL MEDICINE

## 2025-01-07 PROCEDURE — 1036F TOBACCO NON-USER: CPT | Performed by: INTERNAL MEDICINE

## 2025-01-07 PROCEDURE — G2211 COMPLEX E/M VISIT ADD ON: HCPCS | Performed by: INTERNAL MEDICINE

## 2025-01-07 PROCEDURE — 3078F DIAST BP <80 MM HG: CPT | Performed by: INTERNAL MEDICINE

## 2025-01-07 PROCEDURE — 1159F MED LIST DOCD IN RCRD: CPT | Performed by: INTERNAL MEDICINE

## 2025-01-07 ASSESSMENT — PAIN SCALES - GENERAL: PAINLEVEL_OUTOF10: 0-NO PAIN

## 2025-01-07 ASSESSMENT — PATIENT HEALTH QUESTIONNAIRE - PHQ9
2. FEELING DOWN, DEPRESSED OR HOPELESS: NOT AT ALL
SUM OF ALL RESPONSES TO PHQ9 QUESTIONS 1 AND 2: 0
1. LITTLE INTEREST OR PLEASURE IN DOING THINGS: NOT AT ALL

## 2025-01-07 ASSESSMENT — ENCOUNTER SYMPTOMS
DEPRESSION: 0
OCCASIONAL FEELINGS OF UNSTEADINESS: 0
LOSS OF SENSATION IN FEET: 0

## 2025-01-07 NOTE — PROGRESS NOTES
[facility]: [facility]  PROGRESS NOTE      Lowell Mcneill is a 79 y.o. male that is being seen  today for possible POTs.  Subjective   Patient is a 79-year-old male with history of hypertension, type 1 diabetes on insulin pump who is being seen for acute visit with a complaint of dizziness.  Patient states that he had 2 episodes when he had dizziness.  Patient was getting out of his car and he felt dizzy once.  Denies any fall.  Denies any headache.  Denies any recent upper respiratory infections.  Patient has been on insulin pump and his blood sugars have been stable.  Patient's blood pressure is fairly controlled.      ROS  Negative for fever or chills  Negative for sore throat, ear pain, nasal discharge  Negative for cough, shortness of breath or wheezing  Negative for chest pain, palpitations, swelling of legs  Negative for abdominal pain, constipation, diarrhea, blood in the stools  Negative for urinary complaints  Negative for headache, positive for 2 episodes of dizziness, negative for weakness or numbness  Negative for joint pain  Negative for depression or anxiety  All other systems reviewed and were negative   Vitals:    01/07/25 1501   BP: 148/64   Pulse: 71   Temp: 35.9 °C (96.7 °F)   SpO2: 99%      Vitals:    01/07/25 1501   Weight: 97.5 kg (215 lb)     Body mass index is 30.85 kg/m².  Physical Exam  Constitutional: Patient does not appear to be in any acute distress  Head and Face: NCAT  Eyes: Normal external exam, EOMI  ENT: Normal external inspection of ears and nose. Oropharynx normal.  Cardiovascular: RRR, S1/S2, no murmurs, rubs, or gallops, radial pulses +2, no edema of extremities  Pulmonary: CTAB, no respiratory distress.  Abdomen: +BS, soft, non-tender, nondistended, no guarding or rebound, no masses noted  MSK: No joint swelling, normal movements of all extremities. Range of motion- normal.  Skin- No lesions, contusions, or erythema.  Peripheral puslses palpable  bilaterally 2+  Neuro: AAO X3, Cranial nerves 2-12 grossly intact,DTR 2+ in all 4 limbs   Psychiatric: Judgment intact. Appropriate mood and behavior    LABS   [unfilled]  Lab Results   Component Value Date    GLUCOSE 109 (H) 08/12/2024    CALCIUM 9.2 08/12/2024     08/12/2024    K 4.3 08/12/2024    CO2 24 08/12/2024     08/12/2024    BUN 14 08/12/2024    CREATININE 1.30 08/12/2024     Lab Results   Component Value Date    ALT 22 08/12/2024    AST 24 08/12/2024    ALKPHOS 83 08/12/2024    BILITOT 0.4 08/12/2024     Lab Results   Component Value Date    WBC 6.1 08/12/2024    HGB 11.9 (L) 08/12/2024    HCT 36.6 (L) 08/12/2024    MCV 88 08/12/2024     08/12/2024     Lab Results   Component Value Date    CHOL 108 (L) 08/12/2024    CHOL 93 (L) 04/08/2022    CHOL 96 04/19/2021     Lab Results   Component Value Date    HDL 63.0 08/12/2024    HDL 51 04/08/2022    HDL 49.7 04/19/2021     Lab Results   Component Value Date    LDLCALC 32 (L) 08/12/2024    LDLCALC 31 (L) 04/08/2022    LDLCALC 41 (L) 09/23/2020     Lab Results   Component Value Date    TRIG 63 08/12/2024    TRIG 55 04/08/2022    TRIG 66 04/19/2021     Lab Results   Component Value Date    HGBA1C 8.9 (H) 08/12/2024     Other labs not included in the list above were reviewed either before or during this encounter.    History    Past Medical History:   Diagnosis Date    CAD (coronary artery disease)     Diabetes mellitus type 1 (Multi)     Diabetic retinopathy (Multi)     Disorder 08/25/2023    HTN (hypertension)     Hypercholesteremia     Pain in scrotum 12/07/2023    Personal history of other diseases of the nervous system and sense organs     History of cataract    Presence of artificial eye 08/14/2018    Eye globe prosthesis    Prostate cancer (Multi)      Past Surgical History:   Procedure Laterality Date    OTHER SURGICAL HISTORY  09/11/2018    Globe Enucleation Right     No family history on file.  No Known Allergies  Current Outpatient  Medications on File Prior to Visit   Medication Sig Dispense Refill    amLODIPine (Norvasc) 5 mg tablet TAKE 1 TABLET BY MOUTH EVERY DAY 90 tablet 1    aspirin 81 mg EC tablet Take 1 tablet (81 mg) by mouth once daily.      atorvastatin (Lipitor) 80 mg tablet TAKE 1 TABLET BY MOUTH EVERY DAY 90 tablet 3    Baqsimi 3 mg/actuation spray,non-aerosol As directed as needed for severe low blood sugar 2 each 1    benazepril (Lotensin) 10 mg tablet TAKE 1 TABLET BY MOUTH EVERY DAY AS DIRECTED 90 tablet 3    carvedilol (Coreg) 25 mg tablet TAKE 1 TABLET BY MOUTH TWICE A  tablet 3    clopidogrel (Plavix) 75 mg tablet TAKE 1 TABLET BY MOUTH EVERY DAY 90 tablet 3    ezetimibe (Zetia) 10 mg tablet TAKE 1 TABLET BY MOUTH EVERY DAY 90 tablet 3    Gvoke HypoPen 2-Pack 1 mg/0.2 mL auto-injector As directed as needed for severe low blood sugar 0.4 mL 1    insulin lispro (HumaLOG U-100 Insulin) 100 unit/mL injection Up to 100 units daily in insulin pump. 90 mL 3    latanoprost (Xalatan) 0.005 % ophthalmic solution Administer 1 drop into the left eye once daily at bedtime. 7.5 mL 3    tamsulosin (Flomax) 0.4 mg 24 hr capsule Take 1 capsule (0.4 mg) by mouth once daily.       No current facility-administered medications on file prior to visit.     Immunization History   Administered Date(s) Administered    COVID-19, mRNA, LNP-S, PF, 30 mcg/0.3 mL dose 03/16/2021, 04/08/2021, 10/16/2021    Flu vaccine, quadrivalent, high-dose, preservative free, age 65y+ (FLUZONE) 09/17/2021, 10/16/2021, 09/21/2022    Flu vaccine, trivalent, preservative free, HIGH-DOSE, age 65y+ (Fluzone) 09/19/2016, 09/22/2017, 08/31/2018, 10/01/2019, 01/15/2020, 10/07/2024    Flu vaccine, trivalent, preservative free, age 6 months and greater (Fluarix/Fluzone/Flulaval) 10/05/2015    Influenza, Seasonal, Quadrivalent, Adjuvanted 09/02/2020, 08/30/2023    Influenza, seasonal, injectable 10/06/2014    Influenza, trivalent, adjuvanted 08/22/2019    Novel  influenza-H1N1-09, preservative-free 01/19/2010    Pfizer COVID-19 vaccine, 12 years and older, (30mcg/0.3mL) (Comirnaty) 10/24/2023    Pfizer COVID-19 vaccine, bivalent, age 12 years and older (30 mcg/0.3 mL) 09/21/2022    Pfizer Gray Cap SARS-CoV-2 04/12/2022    Pfizer Purple Cap SARS-CoV-2 03/09/2021    Pneumococcal conjugate vaccine, 20-valent (PREVNAR 20) 10/29/2024    Pneumococcal polysaccharide vaccine, 23-valent, age 2 years and older (PNEUMOVAX 23) 10/06/2014, 10/25/2020    RSV, 60 Years And Older (AREXVY) 11/10/2023    Tdap vaccine, age 7 year and older (BOOSTRIX, ADACEL) 08/16/2024    Zoster vaccine, recombinant, adult (SHINGRIX) 10/25/2020, 01/05/2021, 10/02/2023, 12/06/2023     Patient's medical history was reviewed and updated either before or during this encounter.  ASSESSMENT / PLAN:  Diagnoses and all orders for this visit:  Dizziness  Type 1 diabetes mellitus with retinopathy of both eyes without macular edema, unspecified retinopathy severity    Patient is being seen for evaluation for dizziness.  Patient had 1 or 2 episodes of dizziness while getting out of the car.  Denies any other episodes.  Patient is not orthostatic in the office.  No significant increase in the heart rate.  Patient recommended to drink adequate amount of fluids and also keep a watch on his blood sugars when such episodes happen.      Darwin Mims MD

## 2025-02-21 ENCOUNTER — APPOINTMENT (OUTPATIENT)
Dept: PRIMARY CARE | Facility: CLINIC | Age: 80
End: 2025-02-21
Payer: MEDICARE

## 2025-02-24 ENCOUNTER — TELEPHONE (OUTPATIENT)
Dept: ENDOCRINOLOGY | Facility: CLINIC | Age: 80
End: 2025-02-24
Payer: MEDICARE

## 2025-02-24 NOTE — TELEPHONE ENCOUNTER
Lowell Mcneill   1945   [id number]   [phone]       Called and spoke to patient in regard to scheduling 6 week appt per MD. Appt scheduled on 3/27/25 at 1030am and ok'd per MD.

## 2025-02-26 DIAGNOSIS — E10.319 TYPE 1 DIABETES MELLITUS WITH RETINOPATHY OF BOTH EYES WITHOUT MACULAR EDEMA, UNSPECIFIED RETINOPATHY SEVERITY: ICD-10-CM

## 2025-02-26 PROCEDURE — RXMED WILLOW AMBULATORY MEDICATION CHARGE

## 2025-02-26 RX ORDER — INSULIN LISPRO 100 [IU]/ML
INJECTION, SOLUTION INTRAVENOUS; SUBCUTANEOUS
Qty: 90 ML | Refills: 3 | Status: SHIPPED | OUTPATIENT
Start: 2025-02-26

## 2025-02-28 ENCOUNTER — PHARMACY VISIT (OUTPATIENT)
Dept: PHARMACY | Facility: CLINIC | Age: 80
End: 2025-02-28
Payer: MEDICARE

## 2025-03-03 ENCOUNTER — APPOINTMENT (OUTPATIENT)
Dept: OPHTHALMOLOGY | Facility: CLINIC | Age: 80
End: 2025-03-03
Payer: MEDICARE

## 2025-03-03 DIAGNOSIS — H40.1121 PRIMARY OPEN ANGLE GLAUCOMA (POAG) OF LEFT EYE, MILD STAGE: Primary | ICD-10-CM

## 2025-03-03 DIAGNOSIS — H25.12 AGE-RELATED NUCLEAR CATARACT OF LEFT EYE: ICD-10-CM

## 2025-03-03 PROCEDURE — 99213 OFFICE O/P EST LOW 20 MIN: CPT | Performed by: OPHTHALMOLOGY

## 2025-03-03 PROCEDURE — G2211 COMPLEX E/M VISIT ADD ON: HCPCS | Performed by: OPHTHALMOLOGY

## 2025-03-03 ASSESSMENT — CONF VISUAL FIELD
OS_SUPERIOR_NASAL_RESTRICTION: 0
OD_SUPERIOR_NASAL_RESTRICTION: 1
OS_INFERIOR_TEMPORAL_RESTRICTION: 0
OD_INFERIOR_TEMPORAL_RESTRICTION: 1
OS_INFERIOR_NASAL_RESTRICTION: 0
OD_INFERIOR_NASAL_RESTRICTION: 1
OD_SUPERIOR_TEMPORAL_RESTRICTION: 1
OS_SUPERIOR_TEMPORAL_RESTRICTION: 0
OS_NORMAL: 1

## 2025-03-03 ASSESSMENT — PACHYMETRY: OS_CT(UM): 486

## 2025-03-03 ASSESSMENT — ENCOUNTER SYMPTOMS
GASTROINTESTINAL NEGATIVE: 0
CARDIOVASCULAR NEGATIVE: 0
ALLERGIC/IMMUNOLOGIC NEGATIVE: 0
ENDOCRINE NEGATIVE: 0
HEMATOLOGIC/LYMPHATIC NEGATIVE: 0
PSYCHIATRIC NEGATIVE: 0
RESPIRATORY NEGATIVE: 0
CONSTITUTIONAL NEGATIVE: 0
NEUROLOGICAL NEGATIVE: 0
EYES NEGATIVE: 1
MUSCULOSKELETAL NEGATIVE: 0

## 2025-03-03 ASSESSMENT — EXTERNAL EXAM - LEFT EYE: OS_EXAM: NORMAL

## 2025-03-03 ASSESSMENT — TONOMETRY
IOP_METHOD: GOLDMANN APPLANATION
OS_IOP_MMHG: 16

## 2025-03-03 ASSESSMENT — SLIT LAMP EXAM - LIDS
COMMENTS: DERMATOCHALASIS
COMMENTS: DERMATOCHALASIS

## 2025-03-03 ASSESSMENT — VISUAL ACUITY
OS_SC: 20/20
OD_SC: PROSTHESIS
METHOD: SNELLEN - LINEAR

## 2025-03-03 ASSESSMENT — EXTERNAL EXAM - RIGHT EYE: OD_EXAM: NORMAL

## 2025-03-03 NOTE — PROGRESS NOTES
diabetes without  retinopathy, left eye     continue BS control           glaucoma suspect, left eye:  OCULAR HTN, LEFT EYE     no family hx     no trauma     gonio open     pach thin     oct rnfl 10/2/23  still overal robust with good rim area, stable from previous     Coleman Visual Field - OU - Both Eyes          Full field OS          thin pachy     given monocular recommend tx    IOP improved on latan    continue latanoprost qhs OS    This patient requires chronic care for glaucoma, a serious blinding disease. I am the primary manager of this patient's glaucoma, and am prescribing medication(s) to manage the disease. I have provided them counseling on the importance of compliance and/or written instructions on their drops to ensure proper administration.      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 monitor           myopia with presbyopia, left eye:     Establish with Dr. Staton for mrx

## 2025-03-04 DIAGNOSIS — E78.5 HYPERLIPIDEMIA, UNSPECIFIED: ICD-10-CM

## 2025-03-04 RX ORDER — ATORVASTATIN CALCIUM 80 MG/1
80 TABLET, FILM COATED ORAL DAILY
Qty: 90 TABLET | Refills: 3 | Status: SHIPPED | OUTPATIENT
Start: 2025-03-04

## 2025-03-14 ENCOUNTER — OFFICE VISIT (OUTPATIENT)
Dept: CARDIOLOGY | Facility: CLINIC | Age: 80
End: 2025-03-14
Payer: MEDICARE

## 2025-03-14 VITALS
BODY MASS INDEX: 30.28 KG/M2 | DIASTOLIC BLOOD PRESSURE: 62 MMHG | OXYGEN SATURATION: 97 % | HEIGHT: 70 IN | WEIGHT: 211.5 LBS | HEART RATE: 66 BPM | SYSTOLIC BLOOD PRESSURE: 142 MMHG

## 2025-03-14 DIAGNOSIS — E10.3493 TYPE 1 DIABETES MELLITUS WITH SEVERE NONPROLIFERATIVE RETINOPATHY OF BOTH EYES WITHOUT MACULAR EDEMA: ICD-10-CM

## 2025-03-14 DIAGNOSIS — E56.9 VITAMIN DEFICIENCY: Primary | ICD-10-CM

## 2025-03-14 DIAGNOSIS — E55.9 VITAMIN D DEFICIENCY: ICD-10-CM

## 2025-03-14 DIAGNOSIS — I25.10 CORONARY ARTERY DISEASE INVOLVING NATIVE CORONARY ARTERY OF NATIVE HEART WITHOUT ANGINA PECTORIS: ICD-10-CM

## 2025-03-14 PROCEDURE — 3074F SYST BP LT 130 MM HG: CPT | Performed by: INTERNAL MEDICINE

## 2025-03-14 PROCEDURE — 99214 OFFICE O/P EST MOD 30 MIN: CPT | Performed by: INTERNAL MEDICINE

## 2025-03-14 PROCEDURE — 1036F TOBACCO NON-USER: CPT | Performed by: INTERNAL MEDICINE

## 2025-03-14 PROCEDURE — G2211 COMPLEX E/M VISIT ADD ON: HCPCS | Performed by: INTERNAL MEDICINE

## 2025-03-14 PROCEDURE — 1159F MED LIST DOCD IN RCRD: CPT | Performed by: INTERNAL MEDICINE

## 2025-03-14 PROCEDURE — 3078F DIAST BP <80 MM HG: CPT | Performed by: INTERNAL MEDICINE

## 2025-03-14 PROCEDURE — 1126F AMNT PAIN NOTED NONE PRSNT: CPT | Performed by: INTERNAL MEDICINE

## 2025-03-14 PROCEDURE — 1160F RVW MEDS BY RX/DR IN RCRD: CPT | Performed by: INTERNAL MEDICINE

## 2025-03-14 ASSESSMENT — PAIN SCALES - GENERAL: PAINLEVEL_OUTOF10: 0-NO PAIN

## 2025-03-14 NOTE — PATIENT INSTRUCTIONS
Continue medications  Labs ordered (vitamin d , cmp, cbc, A1C , lipid panel )  Follow up in 6 months

## 2025-03-14 NOTE — PROGRESS NOTES
Subjective   Lowell Mcneill is a 79 y.o. male.    Chief Complaint:  LOWELL MCNEILL is being seen for a six month follow-up of angina pectoris, coronary artery disease, dyslipidemia, dyspnea, hypertension, a prior myocardial infarction and a routine medication evaluation.     HPI  This is a 79 y/o male here today for a six month Cardiology follow up visit. He denies chest pain, shortness of breath, heart palpitations, or pedal edema. He continues to have memory issues and fatigue. Reviewed lab work results and current medications. He had a NM stress test in 2021- see report.     Objective   Physical Exam  This is a 79 y/o male here today for a Cardiology follow up visit.  JVP not elevated. Carotid impulses are 2+ without overlying bruit.   Chest exhibits fair to good air movement with completely clear breath sounds.  The cardiac rhythm is regular with no premature beats.   Normal S1 and S2. No gallop, murmur or rub, or click.   Abdomen is soft and benign without focal tenderness.   No peripheral edema. The pedal pulses are intact.     Lab Review:   No visits with results within 6 Month(s) from this visit.   Latest known visit with results is:   Lab on 08/12/2024   Component Date Value    WBC 08/12/2024 6.1     nRBC 08/12/2024 0.0     RBC 08/12/2024 4.17 (L)     Hemoglobin 08/12/2024 11.9 (L)     Hematocrit 08/12/2024 36.6 (L)     MCV 08/12/2024 88     MCH 08/12/2024 28.5     MCHC 08/12/2024 32.5     RDW 08/12/2024 16.4 (H)     Platelets 08/12/2024 166     Glucose 08/12/2024 109 (H)     Sodium 08/12/2024 140     Potassium 08/12/2024 4.3     Chloride 08/12/2024 106     Bicarbonate 08/12/2024 24     Urea Nitrogen 08/12/2024 14     Creatinine 08/12/2024 1.30     eGFR 08/12/2024 57 (L)     Calcium 08/12/2024 9.2     Albumin 08/12/2024 4.1     Alkaline Phosphatase 08/12/2024 83     Total Protein 08/12/2024 6.4     AST 08/12/2024 24     Bilirubin, Total 08/12/2024 0.4     ALT 08/12/2024 22     Anion Gap 08/12/2024 10      Hemoglobin A1C 08/12/2024 8.9 (H)     Estimated Average Glucose 08/12/2024 209     Cholesterol 08/12/2024 108 (L)     HDL-Cholesterol 08/12/2024 63.0     Cholesterol/HDL Ratio 08/12/2024 1.7     LDL Calculated 08/12/2024 32 (L)     Triglycerides 08/12/2024 63      Assessment/Plan   1. Triple-vessel with unstable angina, 06/29/2005, [facility] [place]  2. Multivessel PCI with drug eluting stent to 90% mid LCX stenosis and 80% mid RCA stenosis, 06/2005.  3. PCI drug eluting stent to 80% LAD stenosis, 08/02/2005, .  4. Status post small nontransmural MI, 04/18/2011. Please see previous office notes for review. At the time of repeat cardiac cath on 04/20/2011, the patient was found to have a very lengthy LAD that extended around the LV apex with only 20% to 30% late proximal luminal narrowing, located after the first diagonal branch but prior to a widely patent previously deployed early mid vessel stent. The distal half of the LAD was normal. The nondominant LCX had a 20% to 30% short segmental late proximal stenosis at the site of origin of the first marginal branch. Beyond that site, there was a widely patent mid vessel stent with the distal third of the LCX being normal including 2 terminal posterolateral branches. The dominant RCA had mild 10% to 20% mid vessel luminal irregularity within the previously deployed stent. Overall LV function was preserved. Had last stress testing in 2015. Clinically, he is doing well, still participating in martial arts. His current therapy will be kept unchanged which includes in part aspirin 81 mg daily, Plavix 75 mg daily, carvedilol 25 mg twice a day, and benazepril no 10 mg daily. Recheck office in 6 months with exercise nuclear stress test and echo as discussed.  Doing well without anginal symptoms.  His last nuclear stress test on 6/14/2021 was normal and will need to be repeated in approximately 1 to 2 years before he reaches 81 years old the patient last lipid panel  4/8/2022 includes cholesterol 93 LDL 31 triglyceride 53.  The patient did have a echocardiogram 3/1/2024 which again demonstrates a preserved LV ejection fraction at 60 to 65% aortic valve sclerosis trace mitral valve regurgitation.  Will plan on a repeat nuclear stress test in 1 to 2 years.  Following office visit 3/14/2025 patient will be scheduled for blood work including CBC CMP lipid panel glycohemoglobin and a vitamin D level.  5. Hypertension. Blood pressure today is adequate. Will continue benazepril to 10 mg daily.  The patient blood pressure evidently was elevated recently on 12/25/2023.  He had a home blood pressure of 219/93.  He was seen by his primary care physician on amlodipine 2.5 mg he was started and then subsequently increased to 5 mg daily.  Blood pressures are currently in the 130s over 60s range.  He has no unusual chest pain.  Continue present therapy.  Lab work ordered by his primary care physician includes a CBC CMP glycohemoglobin lipid panel.  6. Low-grade carotid vascular disease. Carotid ultrasound in 08/2012 showed less than 40% stenosis bilaterally with small calcific heterogeneous plaque.  7. Insulin-dependent diabetes. The patient did have lab work performed on 03/20/2017 which included a normal SMA panel except for glucose of 207. His glycohemoglobin was 9.8%. The CBC was normal. His thyroid parameters were normal range with TSH 1.48. Will have follow-up fasting ab work.  Lab work from 8/12/2024 includes a normal SMA panel except for creatinine of 1.30.  His glycohemoglobin was 8.9% although it is insulin pump had evidently malfunctioned and has been replaced.  His hematocrit was 36.6.  Patient has gained approximately 10 to 15 pounds over the last year previous weights having been between 187-195 pounds currently 211 pounds.  As noted above he will be scheduled for repeat lab work including a glycohemoglobin CBC CMP lipid panel vitamin D level.  8. Diabetic retinopathy.  9.  Hyperlipidemia. Continue atorvastatin 80 mg daily. The patient did have a fasting lipid panel on 11/2019 including cholesterol 181 LDL 99 HDL 58 triglyceride 120. Will obtain labs from Dr Vazquez.  The patient's last lipid panel from 4/8/2022 was excellent cholesterol 93 triglyceride 53 LDL 31.  Lab work from 8/12/2024 includes lipids with cholesterol 108 LDL 32 HDL 63 triglycerides 63.  He will remain on both the atorvastatin 80 mg daily and ezetimibe 10 mg daily.  10. Prostate cancer brachial therapy.  As expected the patient's PSA value was less than 0.02 when checked 2/27/2024.  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 The patient injured his right knee and has been receiving cortisone injections by orthopedics.  12. Vitamin D deficiency. Vitamin D level was only 8 when checked on 03/20/2017 and he is now on replacement.

## 2025-03-20 ENCOUNTER — OFFICE VISIT (OUTPATIENT)
Dept: PRIMARY CARE | Facility: CLINIC | Age: 80
End: 2025-03-20
Payer: MEDICARE

## 2025-03-20 VITALS
WEIGHT: 209 LBS | BODY MASS INDEX: 29.92 KG/M2 | DIASTOLIC BLOOD PRESSURE: 62 MMHG | OXYGEN SATURATION: 98 % | HEART RATE: 73 BPM | TEMPERATURE: 96.6 F | HEIGHT: 70 IN | SYSTOLIC BLOOD PRESSURE: 120 MMHG

## 2025-03-20 DIAGNOSIS — E10.319 TYPE 1 DIABETES MELLITUS WITH RETINOPATHY OF BOTH EYES WITHOUT MACULAR EDEMA, UNSPECIFIED RETINOPATHY SEVERITY: ICD-10-CM

## 2025-03-20 DIAGNOSIS — I25.118 CORONARY ARTERY DISEASE OF NATIVE ARTERY OF NATIVE HEART WITH STABLE ANGINA PECTORIS: Primary | ICD-10-CM

## 2025-03-20 DIAGNOSIS — E56.9 VITAMIN DEFICIENCY: ICD-10-CM

## 2025-03-20 DIAGNOSIS — C61 MALIGNANT TUMOR OF PROSTATE (MULTI): ICD-10-CM

## 2025-03-20 DIAGNOSIS — I10 PRIMARY HYPERTENSION: ICD-10-CM

## 2025-03-20 DIAGNOSIS — I95.1 ORTHOSTATIC HYPOTENSION: ICD-10-CM

## 2025-03-20 DIAGNOSIS — E78.2 MIXED HYPERLIPIDEMIA: ICD-10-CM

## 2025-03-20 DIAGNOSIS — I50.9 HEART FAILURE, UNSPECIFIED HF CHRONICITY, UNSPECIFIED HEART FAILURE TYPE: ICD-10-CM

## 2025-03-20 PROCEDURE — 1126F AMNT PAIN NOTED NONE PRSNT: CPT | Performed by: INTERNAL MEDICINE

## 2025-03-20 PROCEDURE — 1159F MED LIST DOCD IN RCRD: CPT | Performed by: INTERNAL MEDICINE

## 2025-03-20 PROCEDURE — 3074F SYST BP LT 130 MM HG: CPT | Performed by: INTERNAL MEDICINE

## 2025-03-20 PROCEDURE — 99214 OFFICE O/P EST MOD 30 MIN: CPT | Performed by: INTERNAL MEDICINE

## 2025-03-20 PROCEDURE — 3078F DIAST BP <80 MM HG: CPT | Performed by: INTERNAL MEDICINE

## 2025-03-20 PROCEDURE — G2211 COMPLEX E/M VISIT ADD ON: HCPCS | Performed by: INTERNAL MEDICINE

## 2025-03-20 ASSESSMENT — ENCOUNTER SYMPTOMS
OCCASIONAL FEELINGS OF UNSTEADINESS: 0
DEPRESSION: 0
LOSS OF SENSATION IN FEET: 0

## 2025-03-20 ASSESSMENT — PAIN SCALES - GENERAL: PAINLEVEL_OUTOF10: 0-NO PAIN

## 2025-03-23 NOTE — PROGRESS NOTES
[facility]: [facility]  PROGRESS NOTE      Lowell Mcneill is a 79 y.o. male that is being seen  today for Follow-up.  Subjective   Patient is a 79-year-old male with a history of type 1 diabetes on insulin pump, coronary artery disease, hypertension, hyperlipidemia who is being seen for 6-month follow-up.  Patient lab work reviewed and has been stable.  Patient will follow-up with the endocrinologist.  Patient denies any issues with the medications.  Patient does have previous history of prostate cancer and is in remission.  Patient's blood pressure is fairly controlled with current medications.  No episodes of low blood sugars.      ROS  Negative for fever or chills  Negative for sore throat, ear pain, nasal discharge  Negative for cough, shortness of breath or wheezing  Negative for chest pain, palpitations, swelling of legs  Negative for abdominal pain, constipation, diarrhea, blood in the stools  Negative for urinary complaints  Negative for headache, dizziness, weakness or numbness  Negative for joint pain  Negative for depression or anxiety  All other systems reviewed and were negative   Vitals:    03/20/25 1124   BP: 120/62   Pulse: 73   Temp: 35.9 °C (96.6 °F)   SpO2: 98%      Vitals:    03/20/25 1124   Weight: 94.8 kg (209 lb)     Body mass index is 29.99 kg/m².  Physical Exam  Constitutional: Patient does not appear to be in any acute distress  Head and Face: NCAT  Eyes: Normal external exam, EOMI  ENT: Normal external inspection of ears and nose. Oropharynx normal.  Cardiovascular: RRR, S1/S2, no murmurs, rubs, or gallops, radial pulses +2, no edema of extremities  Pulmonary: CTAB, no respiratory distress.  Abdomen: +BS, soft, non-tender, nondistended, no guarding or rebound, no masses noted  MSK: No joint swelling, normal movements of all extremities. Range of motion- normal.  Skin- No lesions, contusions, or erythema.  Peripheral puslses palpable bilaterally 2+  Neuro: AAO X3,  Cranial nerves 2-12 grossly intact,DTR 2+ in all 4 limbs   Psychiatric: Judgment intact. Appropriate mood and behavior    LABS   [unfilled]  Lab Results   Component Value Date    GLUCOSE 109 (H) 08/12/2024    CALCIUM 9.2 08/12/2024     08/12/2024    K 4.3 08/12/2024    CO2 24 08/12/2024     08/12/2024    BUN 14 08/12/2024    CREATININE 1.30 08/12/2024     Lab Results   Component Value Date    ALT 22 08/12/2024    AST 24 08/12/2024    ALKPHOS 83 08/12/2024    BILITOT 0.4 08/12/2024     Lab Results   Component Value Date    WBC 6.1 08/12/2024    HGB 11.9 (L) 08/12/2024    HCT 36.6 (L) 08/12/2024    MCV 88 08/12/2024     08/12/2024     Lab Results   Component Value Date    CHOL 108 (L) 08/12/2024    CHOL 93 (L) 04/08/2022    CHOL 96 04/19/2021     Lab Results   Component Value Date    HDL 63.0 08/12/2024    HDL 51 04/08/2022    HDL 49.7 04/19/2021     Lab Results   Component Value Date    LDLCALC 32 (L) 08/12/2024    LDLCALC 31 (L) 04/08/2022    LDLCALC 41 (L) 09/23/2020     Lab Results   Component Value Date    TRIG 63 08/12/2024    TRIG 55 04/08/2022    TRIG 66 04/19/2021     Lab Results   Component Value Date    HGBA1C 8.9 (H) 08/12/2024     Other labs not included in the list above were reviewed either before or during this encounter.    History    Past Medical History:   Diagnosis Date    CAD (coronary artery disease)     Diabetes mellitus type 1 (Multi)     Diabetic retinopathy (Multi)     Disorder 08/25/2023    HTN (hypertension)     Hypercholesteremia     Pain in scrotum 12/07/2023    Personal history of other diseases of the nervous system and sense organs     History of cataract    Presence of artificial eye 08/14/2018    Eye globe prosthesis    Prostate cancer (Multi)      Past Surgical History:   Procedure Laterality Date    OTHER SURGICAL HISTORY  09/11/2018    Globe Enucleation Right     No family history on file.  No Known Allergies  Current Outpatient Medications on File Prior to  Visit   Medication Sig Dispense Refill    amLODIPine (Norvasc) 5 mg tablet TAKE 1 TABLET BY MOUTH EVERY DAY 90 tablet 1    aspirin 81 mg EC tablet Take 1 tablet (81 mg) by mouth once daily.      atorvastatin (Lipitor) 80 mg tablet TAKE 1 TABLET BY MOUTH EVERY DAY 90 tablet 3    Baqsimi 3 mg/actuation spray,non-aerosol As directed as needed for severe low blood sugar 2 each 1    benazepril (Lotensin) 10 mg tablet TAKE 1 TABLET BY MOUTH EVERY DAY AS DIRECTED 90 tablet 3    carvedilol (Coreg) 25 mg tablet TAKE 1 TABLET BY MOUTH TWICE A  tablet 3    clopidogrel (Plavix) 75 mg tablet TAKE 1 TABLET BY MOUTH EVERY DAY 90 tablet 3    ezetimibe (Zetia) 10 mg tablet TAKE 1 TABLET BY MOUTH EVERY DAY 90 tablet 3    Gvoke HypoPen 2-Pack 1 mg/0.2 mL auto-injector As directed as needed for severe low blood sugar 0.4 mL 1    insulin lispro (HumaLOG U-100 Insulin) 100 unit/mL injection Up to 100 units daily in insulin pump. 90 mL 3    latanoprost (Xalatan) 0.005 % ophthalmic solution Administer 1 drop into the left eye once daily at bedtime. 7.5 mL 3    tamsulosin (Flomax) 0.4 mg 24 hr capsule Take 1 capsule (0.4 mg) by mouth once daily.       No current facility-administered medications on file prior to visit.     Immunization History   Administered Date(s) Administered    COVID-19, mRNA, LNP-S, PF, 30 mcg/0.3 mL dose 03/16/2021, 04/08/2021, 10/16/2021    Flu vaccine, quadrivalent, high-dose, preservative free, age 65y+ (FLUZONE) 09/17/2021, 10/16/2021, 09/21/2022    Flu vaccine, trivalent, preservative free, HIGH-DOSE, age 65y+ (Fluzone) 09/19/2016, 09/22/2017, 08/31/2018, 10/01/2019, 01/15/2020, 10/07/2024    Flu vaccine, trivalent, preservative free, age 6 months and greater (Fluarix/Fluzone/Flulaval) 10/05/2015    Influenza, Seasonal, Quadrivalent, Adjuvanted 09/02/2020, 08/30/2023    Influenza, seasonal, injectable 10/06/2014    Influenza, trivalent, adjuvanted 08/22/2019    Novel influenza-H1N1-09, preservative-free  01/19/2010    Pfizer COVID-19 vaccine, 12 years and older, (30mcg/0.3mL) (Comirnaty) 10/24/2023    Pfizer COVID-19 vaccine, bivalent, age 12 years and older (30 mcg/0.3 mL) 09/21/2022    Pfizer Gray Cap SARS-CoV-2 04/12/2022    Pfizer Purple Cap SARS-CoV-2 03/09/2021    Pneumococcal conjugate vaccine, 20-valent (PREVNAR 20) 10/29/2024    Pneumococcal polysaccharide vaccine, 23-valent, age 2 years and older (PNEUMOVAX 23) 10/06/2014, 10/25/2020    RSV, 60 Years And Older (AREXVY) 11/10/2023    Tdap vaccine, age 7 year and older (BOOSTRIX, ADACEL) 08/16/2024    Zoster vaccine, recombinant, adult (SHINGRIX) 10/25/2020, 01/05/2021, 10/02/2023, 12/06/2023     Patient's medical history was reviewed and updated either before or during this encounter.  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  Patient has follow-up with the endocrinologist and blood sugars have been stable with insulin pump.  Patient's PSA levels have been stable.  Patient does follow-up with the urologist.  Patient has been seen by cardiologist and had his lab work ordered will be done in the next few weeks.      Darwin Mims MD

## 2025-03-26 NOTE — PROGRESS NOTES
HPI   78 yo  of Dr. 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  Last A1c 9.2% - today 8.4%.      Pt is testing sugars 4+ times per day with Dexcom G7.  Pt is having low sugars 0-1 times/week. Pt is following a carb controlled diet and knows reasonable carb allowances. Pt is able to afford their medications. Pt is exercising, does martial arts, walks on treadmill daily.     Pump: Tandem tslim using control IQ with Dexcom G7    -initial training w/ [employer] Nov 2023,  met a second time for additional training shortly after      Basal:   12a 1.2  4a 1.2  12p 1.5  ICR:   12a 1:8  4a 1:5  12p 1:4.5   ISF:   12a 30  4a 20    12p 20       -does not have smartphone compatible with pop though wife does.       -pt not bolusing at meals is the major issue now     Previous insulin regimen: tresiba 23 units and humalog 1:4, plus isf 20>120.    14 day dexcom data: 46% in range, 1.8% low, pattern: 200's overnight, waking low 100's/after breakfast mid 200's, upper 100's through the day/mid 200's after dinner into bedtime.         Taking atorvastatin 80mg and zetia 10mg for lipids and tolerating.     Taking benazaprl 10mg,  carvedilol 25mg (1/2 bid) & amlodipine 5mg for htn.      Current Outpatient Medications:     amLODIPine (Norvasc) 5 mg tablet, TAKE 1 TABLET BY MOUTH EVERY DAY, Disp: 90 tablet, Rfl: 1    aspirin 81 mg EC tablet, Take 1 tablet (81 mg) by mouth once daily., Disp: , Rfl:     atorvastatin (Lipitor) 80 mg tablet, TAKE 1 TABLET BY MOUTH EVERY DAY, Disp: 90 tablet, Rfl: 3    Baqsimi 3 mg/actuation spray,non-aerosol, As directed as needed for severe low blood sugar, Disp: 2 each, Rfl: 1    benazepril (Lotensin) 10 mg tablet, TAKE 1 TABLET BY MOUTH EVERY DAY AS DIRECTED, Disp: 90 tablet, Rfl: 3    carvedilol (Coreg) 25 mg tablet, TAKE 1 TABLET BY MOUTH TWICE A DAY, Disp: 180 tablet, Rfl: 3    clopidogrel (Plavix) 75 mg tablet, TAKE 1 TABLET BY  "MOUTH EVERY DAY, Disp: 90 tablet, Rfl: 3    ezetimibe (Zetia) 10 mg tablet, TAKE 1 TABLET BY MOUTH EVERY DAY, Disp: 90 tablet, Rfl: 3    Gvoke HypoPen 2-Pack 1 mg/0.2 mL auto-injector, As directed as needed for severe low blood sugar, Disp: 0.4 mL, Rfl: 1    insulin lispro (HumaLOG U-100 Insulin) 100 unit/mL injection, Up to 100 units daily in insulin pump., Disp: 90 mL, Rfl: 3    latanoprost (Xalatan) 0.005 % ophthalmic solution, Administer 1 drop into the left eye once daily at bedtime., Disp: 7.5 mL, Rfl: 3    tamsulosin (Flomax) 0.4 mg 24 hr capsule, Take 1 capsule (0.4 mg) by mouth once daily., Disp: , Rfl:       Allergies as of 03/27/2025    (No Known Allergies)         Review of Systems   Cardiology: Lightheadedness-on occ  Chest pain-denies.  Leg edema-denies.  Palpitations-denies.  Respiratory: Cough-denies. Shortness of breath-denies.  Wheezing-denies.  Gastroenterology: Constipation-denies.  Diarrhea-denies.  Heartburn-denies.  Endocrinology: Cold intolerance-denies.  Heat intolerance-denies.  Sweats-denies.  Neurology: Headache-denies.  Tremor-denies.  Neuropathy in extremities-denies.  Psychology: Low energy-denies.  Irritability-denies.  Sleep disturbances-denies.      /60 (BP Location: Left arm, Patient Position: Sitting)   Pulse 72   Ht 1.778 m (5' 10\")   Wt 95.8 kg (211 lb 3.2 oz)   BMI 30.30 kg/m²       Labs:  Lab Results   Component Value Date    WBC 6.1 08/12/2024    NRBC 0.0 08/12/2024    RBC 4.17 (L) 08/12/2024    HGB 11.9 (L) 08/12/2024    HCT 36.6 (L) 08/12/2024     08/12/2024     Lab Results   Component Value Date    CALCIUM 9.2 08/12/2024    AST 24 08/12/2024    ALKPHOS 83 08/12/2024    BILITOT 0.4 08/12/2024    PROT 6.4 08/12/2024    ALBUMIN 4.1 08/12/2024    GLOB 2.6 10/14/2022    AGR 1.5 10/14/2022     08/12/2024    K 4.3 08/12/2024     08/12/2024    CO2 24 08/12/2024    ANIONGAP 10 08/12/2024    BUN 14 08/12/2024    CREATININE 1.30 08/12/2024    UREACREAUR " 15.4 07/13/2023    GLUCOSE 109 (H) 08/12/2024    ALT 22 08/12/2024    EGFR 57 (L) 08/12/2024     Lab Results   Component Value Date    CHOL 108 (L) 08/12/2024    TRIG 63 08/12/2024    HDL 63.0 08/12/2024    LDLCALC 32 (L) 08/12/2024     Lab Results   Component Value Date    MICROALBCREA 52.3 (H) 04/08/2022     Lab Results   Component Value Date    TSH 1.33 04/19/2021     Lab Results   Component Value Date    ASZWEQNJ89 620 04/19/2021     Lab Results   Component Value Date    HGBA1C 8.4 (A) 03/27/2025         Physical Exam   General Appearance: pleasant, cooperative, no acute distress  HEENT: no chemosis, no proptosis, no lid lag, no lid retraction  Neck: no lymphadenopathy, no thyromegaly, no dominant thyroid nodules  Heart: no murmurs, regular rate and rhythm, S1 and S2  Lungs: no wheezes, no rhonci, no rales  Extremities: no lower extremity swelling      Assessment/Plan   1. Type 1 diabetes mellitus with hyperglycemia (Multi) (Primary)  -A1c ordered and reviewed  -dexcom/pump data reviewed  -labs reviewed    -biggest issue pt not bolusing for meals, simply enter carbs for meals (pt knows how to carb count)  -target A1c in the 7.5% range, if he enters carbs at meals A1c should be <8% next visit    2. Mixed hyperlipidemia  -at target on statin, labs reviewed, will follow    3. Hypertension, unspecified type  -at target on therapy, will follow         Follow Up:  BALTAZAR Dugan 3 months    Medical Decision Making  Complexity of problem: Chronic illness of diabetes mellitus uncontrolled, progressing  Data analyzed and reviewed: Reviewed prior notes, blood glucose data, labs including HgbA1c, lipids, serum chemistries.  Ordered tests.   Risk of complications and morbidities: Is definite because of use of insulin and risk of hypoglycemia.  Prescription medications reviewed and modifications made.  Compliance assessed.  Addressed social determinants of health including food insecurity.

## 2025-03-27 ENCOUNTER — APPOINTMENT (OUTPATIENT)
Dept: ENDOCRINOLOGY | Facility: CLINIC | Age: 80
End: 2025-03-27
Payer: MEDICARE

## 2025-03-27 VITALS
DIASTOLIC BLOOD PRESSURE: 60 MMHG | SYSTOLIC BLOOD PRESSURE: 135 MMHG | HEIGHT: 70 IN | HEART RATE: 72 BPM | BODY MASS INDEX: 30.24 KG/M2 | WEIGHT: 211.2 LBS

## 2025-03-27 DIAGNOSIS — E78.2 MIXED HYPERLIPIDEMIA: ICD-10-CM

## 2025-03-27 DIAGNOSIS — E10.65 TYPE 1 DIABETES MELLITUS WITH HYPERGLYCEMIA (MULTI): Primary | ICD-10-CM

## 2025-03-27 DIAGNOSIS — I10 HYPERTENSION, UNSPECIFIED TYPE: ICD-10-CM

## 2025-03-27 LAB — POC HEMOGLOBIN A1C: 8.4 % (ref 4.2–6.5)

## 2025-03-27 PROCEDURE — 83036 HEMOGLOBIN GLYCOSYLATED A1C: CPT | Performed by: INTERNAL MEDICINE

## 2025-03-27 PROCEDURE — 1126F AMNT PAIN NOTED NONE PRSNT: CPT | Performed by: INTERNAL MEDICINE

## 2025-03-27 PROCEDURE — 1159F MED LIST DOCD IN RCRD: CPT | Performed by: INTERNAL MEDICINE

## 2025-03-27 PROCEDURE — 3075F SYST BP GE 130 - 139MM HG: CPT | Performed by: INTERNAL MEDICINE

## 2025-03-27 PROCEDURE — 3078F DIAST BP <80 MM HG: CPT | Performed by: INTERNAL MEDICINE

## 2025-03-27 PROCEDURE — 1036F TOBACCO NON-USER: CPT | Performed by: INTERNAL MEDICINE

## 2025-03-27 PROCEDURE — 99214 OFFICE O/P EST MOD 30 MIN: CPT | Performed by: INTERNAL MEDICINE

## 2025-03-27 ASSESSMENT — PAIN SCALES - GENERAL: PAINLEVEL_OUTOF10: 0-NO PAIN

## 2025-04-15 ENCOUNTER — OFFICE VISIT (OUTPATIENT)
Dept: ENDOCRINOLOGY | Facility: CLINIC | Age: 80
End: 2025-04-15
Payer: MEDICARE

## 2025-04-15 VITALS — SYSTOLIC BLOOD PRESSURE: 103 MMHG | DIASTOLIC BLOOD PRESSURE: 55 MMHG | HEART RATE: 68 BPM

## 2025-04-15 DIAGNOSIS — I10 HYPERTENSION, UNSPECIFIED TYPE: ICD-10-CM

## 2025-04-15 DIAGNOSIS — E78.2 MIXED HYPERLIPIDEMIA: ICD-10-CM

## 2025-04-15 DIAGNOSIS — E10.65 TYPE 1 DIABETES MELLITUS WITH HYPERGLYCEMIA (MULTI): Primary | ICD-10-CM

## 2025-04-15 LAB
25(OH)D3+25(OH)D2 SERPL-MCNC: 17 NG/ML (ref 30–100)
ALBUMIN SERPL-MCNC: 4.2 G/DL (ref 3.6–5.1)
ALBUMIN/CREAT UR: 76 MG/G CREAT
ALP SERPL-CCNC: 75 U/L (ref 35–144)
ALT SERPL-CCNC: 22 U/L (ref 9–46)
ANION GAP SERPL CALCULATED.4IONS-SCNC: 7 MMOL/L (CALC) (ref 7–17)
AST SERPL-CCNC: 20 U/L (ref 10–35)
BILIRUB SERPL-MCNC: 0.4 MG/DL (ref 0.2–1.2)
BUN SERPL-MCNC: 19 MG/DL (ref 7–25)
CALCIUM SERPL-MCNC: 8.8 MG/DL (ref 8.6–10.3)
CHLORIDE SERPL-SCNC: 109 MMOL/L (ref 98–110)
CHOLEST SERPL-MCNC: 102 MG/DL
CHOLEST/HDLC SERPL: 1.9 (CALC)
CO2 SERPL-SCNC: 27 MMOL/L (ref 20–32)
CREAT SERPL-MCNC: 1.04 MG/DL (ref 0.7–1.28)
CREAT UR-MCNC: 141 MG/DL (ref 20–320)
EGFRCR SERPLBLD CKD-EPI 2021: 73 ML/MIN/1.73M2
ERYTHROCYTE [DISTWIDTH] IN BLOOD BY AUTOMATED COUNT: 15.3 % (ref 11–15)
EST. AVERAGE GLUCOSE BLD GHB EST-MCNC: 223 MG/DL
EST. AVERAGE GLUCOSE BLD GHB EST-SCNC: 12.4 MMOL/L
GLUCOSE SERPL-MCNC: 119 MG/DL (ref 65–99)
HBA1C MFR BLD: 9.4 %
HCT VFR BLD AUTO: 37.7 % (ref 38.5–50)
HDLC SERPL-MCNC: 53 MG/DL
HGB BLD-MCNC: 12 G/DL (ref 13.2–17.1)
LDLC SERPL CALC-MCNC: 35 MG/DL (CALC)
MCH RBC QN AUTO: 27.9 PG (ref 27–33)
MCHC RBC AUTO-ENTMCNC: 31.8 G/DL (ref 32–36)
MCV RBC AUTO: 87.7 FL (ref 80–100)
MICROALBUMIN UR-MCNC: 10.7 MG/DL
NONHDLC SERPL-MCNC: 49 MG/DL (CALC)
PLATELET # BLD AUTO: 140 THOUSAND/UL (ref 140–400)
PMV BLD REES-ECKER: 9.1 FL (ref 7.5–12.5)
POTASSIUM SERPL-SCNC: 4.1 MMOL/L (ref 3.5–5.3)
PROT SERPL-MCNC: 6.5 G/DL (ref 6.1–8.1)
RBC # BLD AUTO: 4.3 MILLION/UL (ref 4.2–5.8)
SODIUM SERPL-SCNC: 143 MMOL/L (ref 135–146)
TRIGL SERPL-MCNC: 64 MG/DL
TSH SERPL-ACNC: 1.98 MIU/L (ref 0.4–4.5)
WBC # BLD AUTO: 6.1 THOUSAND/UL (ref 3.8–10.8)

## 2025-04-15 PROCEDURE — 1036F TOBACCO NON-USER: CPT | Performed by: INTERNAL MEDICINE

## 2025-04-15 PROCEDURE — 1125F AMNT PAIN NOTED PAIN PRSNT: CPT | Performed by: INTERNAL MEDICINE

## 2025-04-15 PROCEDURE — 99214 OFFICE O/P EST MOD 30 MIN: CPT | Performed by: INTERNAL MEDICINE

## 2025-04-15 PROCEDURE — 3074F SYST BP LT 130 MM HG: CPT | Performed by: INTERNAL MEDICINE

## 2025-04-15 PROCEDURE — 3078F DIAST BP <80 MM HG: CPT | Performed by: INTERNAL MEDICINE

## 2025-04-15 PROCEDURE — 1159F MED LIST DOCD IN RCRD: CPT | Performed by: INTERNAL MEDICINE

## 2025-04-15 RX ORDER — SULFAMETHOXAZOLE AND TRIMETHOPRIM 800; 160 MG/1; MG/1
TABLET ORAL
Qty: 20 TABLET | Refills: 0 | Status: SHIPPED | OUTPATIENT
Start: 2025-04-15

## 2025-04-15 ASSESSMENT — PAIN SCALES - GENERAL: PAINLEVEL_OUTOF10: 2

## 2025-04-15 ASSESSMENT — ENCOUNTER SYMPTOMS: DEPRESSION: 0

## 2025-04-15 NOTE — PROGRESS NOTES
HPI   80 yo  of 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  Last A1c 8.4%, comes in for urgent evaluation today.      Pt is testing sugars 4+ times per day with Dexcom G7.  Pt is having low sugars 0-1 times/week. Pt is following a carb controlled diet and knows reasonable carb allowances. Pt is able to afford their medications. Pt is exercising, does martial arts, walks on treadmill daily.     Pump: Tandem tslim using control IQ with Dexcom G7    -initial training w/ [facility] Nov 2023,  met a second time for additional training shortly after      Basal:   12a 1.2  4a 1.2  12p 1.5  ICR:   12a 1:8  4a 1:5  12p 1:4.5   ISF:   12a 30  4a 20    12p 20       -does not have smartphone compatible with pop though wife does.       -pt starting bolusing, but only about 50% of the time      Previous insulin regimen: tresiba 23 units and humalog 1:4, plus isf 20>120.     14 day dexcom data: 51% in range, 1.1% low, pattern: 200's overnight, waking low 100's/after breakfast mid 200's, upper 100's through the day/mid 200's after dinner into bedtime.         Taking atorvastatin 80mg and zetia 10mg for lipids and tolerating.     Taking benazaprl 10mg,  carvedilol 25mg (1/2 bid) & amlodipine 5mg for htn.    -for about 12 days pt has area he used for infusion site, has small abscess about the size of a quarter  -no discharge, no fever      Current Outpatient Medications:     amLODIPine (Norvasc) 5 mg tablet, TAKE 1 TABLET BY MOUTH EVERY DAY, Disp: 90 tablet, Rfl: 1    aspirin 81 mg EC tablet, Take 1 tablet (81 mg) by mouth once daily., Disp: , Rfl:     atorvastatin (Lipitor) 80 mg tablet, TAKE 1 TABLET BY MOUTH EVERY DAY, Disp: 90 tablet, Rfl: 3    Baqsimi 3 mg/actuation spray,non-aerosol, As directed as needed for severe low blood sugar, Disp: 2 each, Rfl: 1    benazepril (Lotensin) 10 mg tablet, TAKE 1 TABLET BY MOUTH EVERY DAY AS DIRECTED, Disp: 90  tablet, Rfl: 3    carvedilol (Coreg) 25 mg tablet, TAKE 1 TABLET BY MOUTH TWICE A DAY, Disp: 180 tablet, Rfl: 3    clopidogrel (Plavix) 75 mg tablet, TAKE 1 TABLET BY MOUTH EVERY DAY, Disp: 90 tablet, Rfl: 3    ezetimibe (Zetia) 10 mg tablet, TAKE 1 TABLET BY MOUTH EVERY DAY, Disp: 90 tablet, Rfl: 3    Gvoke HypoPen 2-Pack 1 mg/0.2 mL auto-injector, As directed as needed for severe low blood sugar, Disp: 0.4 mL, Rfl: 1    insulin lispro (HumaLOG U-100 Insulin) 100 unit/mL injection, Up to 100 units daily in insulin pump., Disp: 90 mL, Rfl: 3    latanoprost (Xalatan) 0.005 % ophthalmic solution, Administer 1 drop into the left eye once daily at bedtime., Disp: 7.5 mL, Rfl: 3    tamsulosin (Flomax) 0.4 mg 24 hr capsule, Take 1 capsule (0.4 mg) by mouth once daily., Disp: , Rfl:       Allergies as of 04/15/2025    (No Known Allergies)         Review of Systems   Cardiology: Lightheadedness-denies.  Chest pain-denies.  Leg edema-denies.  Palpitations-denies.  Respiratory: Cough-denies. Shortness of breath-denies.  Wheezing-denies.  Gastroenterology: Constipation-denies.  Diarrhea-denies.  Heartburn-denies.  Endocrinology: Cold intolerance-denies.  Heat intolerance-denies.  Sweats-denies.  Neurology: Headache-denies.  Tremor-denies.  Neuropathy in extremities-denies.  Psychology: Low energy-denies.  Irritability-denies.  Sleep disturbances-denies.      /55 (BP Location: Right arm, Patient Position: Sitting, BP Cuff Size: Adult)   Pulse 68       Labs:  Lab Results   Component Value Date    WBC 6.1 04/14/2025    NRBC 0.0 08/12/2024    RBC 4.30 04/14/2025    HGB 12.0 (L) 04/14/2025    HCT 37.7 (L) 04/14/2025     04/14/2025     Lab Results   Component Value Date    CALCIUM 8.8 04/14/2025    AST 20 04/14/2025    ALKPHOS 75 04/14/2025    BILITOT 0.4 04/14/2025    PROT 6.5 04/14/2025    ALBUMIN 4.2 04/14/2025    GLOB 2.6 10/14/2022    AGR 1.5 10/14/2022     04/14/2025    K 4.1 04/14/2025     04/14/2025     CO2 27 04/14/2025    ANIONGAP 7 04/14/2025    BUN 19 04/14/2025    CREATININE 1.04 04/14/2025    UREACREAUR 15.4 07/13/2023    GLUCOSE 119 (H) 04/14/2025    ALT 22 04/14/2025    EGFR 73 04/14/2025     Lab Results   Component Value Date    CHOL 102 04/14/2025    TRIG 64 04/14/2025    HDL 53 04/14/2025    LDLCALC 35 04/14/2025     Lab Results   Component Value Date    MICROALBCREA 52.3 (H) 04/08/2022     Lab Results   Component Value Date    TSH 1.98 04/14/2025     Lab Results   Component Value Date    NTQMZKLX92 620 04/19/2021     Lab Results   Component Value Date    HGBA1C 9.4 (H) 04/14/2025         Physical Exam   General Appearance: pleasant, cooperative, no acute distress  HEENT: no chemosis, no proptosis, no lid lag, no lid retraction  Neck: no lymphadenopathy, no thyromegaly, no dominant thyroid nodules  Heart: no murmurs, regular rate and rhythm, S1 and S2  Lungs: no wheezes, no rhonci, no rales  Extremities: no lower extremity swelling  -abdomen with firm/red/warm area about the size of a quarter, no discharge      Assessment/Plan   1. Type 1 diabetes mellitus with hyperglycemia (Multi) (Primary)  -pt with small abscess 12 days out about the size of a quarter on abdomen, firm/no discharge    -warm compress bid  -give rx for bactrim ds bid X 10 days, if not improved in 3 days go to ER to consider I and D    -labs/cgm/pump data reviewed  -some improvement in sugars now that pt is entering carbs when eating, issue is only doing this about 50% of the time  -continue to bolus at meals, enter at least 30 grams/carb if uncertain    2. Mixed hyperlipidemia  -on statin and tolerating, labs reviewed    3. Hypertension, unspecified type  -at target will follow      Follow Up:  -keep scheduled visit    -labs/tests/notes reviewed  -reviewed and counseled patient on medication monitoring and side effects

## 2025-04-16 ENCOUNTER — TELEPHONE (OUTPATIENT)
Dept: PRIMARY CARE | Facility: CLINIC | Age: 80
End: 2025-04-16
Payer: MEDICARE

## 2025-04-16 NOTE — TELEPHONE ENCOUNTER
----- Message from Darwin Mims sent at 4/16/2025 12:11 PM EDT -----  Labs are stable.  ----- Message -----  From: [email or web address] Results In  Sent: 4/14/2025  11:40 PM EDT  To: Darwin Mims MD

## 2025-04-25 ENCOUNTER — HOSPITAL ENCOUNTER (EMERGENCY)
Facility: HOSPITAL | Age: 80
Discharge: HOME | End: 2025-04-25
Attending: STUDENT IN AN ORGANIZED HEALTH CARE EDUCATION/TRAINING PROGRAM
Payer: MEDICARE

## 2025-04-25 VITALS
WEIGHT: 207.23 LBS | RESPIRATION RATE: 20 BRPM | HEIGHT: 70 IN | OXYGEN SATURATION: 100 % | SYSTOLIC BLOOD PRESSURE: 166 MMHG | DIASTOLIC BLOOD PRESSURE: 85 MMHG | TEMPERATURE: 97.9 F | HEART RATE: 63 BPM | BODY MASS INDEX: 29.67 KG/M2

## 2025-04-25 DIAGNOSIS — L02.92 CARBUNCLE AND FURUNCLE: ICD-10-CM

## 2025-04-25 DIAGNOSIS — L02.91 ABSCESS: Primary | ICD-10-CM

## 2025-04-25 DIAGNOSIS — L02.93 CARBUNCLE AND FURUNCLE: ICD-10-CM

## 2025-04-25 PROCEDURE — 99281 EMR DPT VST MAYX REQ PHY/QHP: CPT | Performed by: STUDENT IN AN ORGANIZED HEALTH CARE EDUCATION/TRAINING PROGRAM

## 2025-04-25 ASSESSMENT — COLUMBIA-SUICIDE SEVERITY RATING SCALE - C-SSRS
2. HAVE YOU ACTUALLY HAD ANY THOUGHTS OF KILLING YOURSELF?: NO
1. IN THE PAST MONTH, HAVE YOU WISHED YOU WERE DEAD OR WISHED YOU COULD GO TO SLEEP AND NOT WAKE UP?: NO
6. HAVE YOU EVER DONE ANYTHING, STARTED TO DO ANYTHING, OR PREPARED TO DO ANYTHING TO END YOUR LIFE?: NO

## 2025-04-25 ASSESSMENT — PAIN - FUNCTIONAL ASSESSMENT: PAIN_FUNCTIONAL_ASSESSMENT: 0-10

## 2025-04-25 ASSESSMENT — PAIN SCALES - GENERAL: PAINLEVEL_OUTOF10: 0 - NO PAIN

## 2025-04-25 NOTE — ED PROVIDER NOTES
"HPI   Chief Complaint   Patient presents with    Abscess     Has spot on abd that was caused from insulin pump       Patient presents ED for evaluation of bump on abdomen where he has placed his insulin pump needle.  Notes became firm but is not enlarged.  Nonpainful, no drainage.  Talk to his PCP who prescribed him antibiotics.  Denies any fever/chills.  Denies any associated abdominal pain or nausea/vomiting and is not experiencing abdominal distention.  Notes no changes to bowel/bladder habits              Patient History   Medical History[1]  Surgical History[2]  Family History[3]  Social History[4]    Physical Exam   Blood pressure 166/85, pulse 63, temperature 36.6 °C (97.9 °F), temperature source Oral, resp. rate 20, height 1.778 m (5' 10\"), weight 94 kg (207 lb 3.7 oz), SpO2 100%.      Physical Exam  Vitals and nursing note reviewed.   Constitutional:       General: He is not in acute distress.     Appearance: Normal appearance. He is normal weight. He is not ill-appearing.   HENT:      Nose: Nose normal.      Mouth/Throat:      Mouth: Mucous membranes are moist.      Pharynx: Oropharynx is clear.   Eyes:      General: No scleral icterus.     Pupils: Pupils are equal, round, and reactive to light.   Cardiovascular:      Rate and Rhythm: Normal rate.   Pulmonary:      Effort: Pulmonary effort is normal.   Abdominal:      General: Abdomen is protuberant.      Palpations: Abdomen is soft.      Tenderness: There is no abdominal tenderness.      Comments: 5 cm circumferential left lateral periumbilical carbuncle/furuncle with minimal serous drainage and no significant fluctuance, no tenderness to palpation, no surrounding erythema   Skin:     General: Skin is warm and dry.      Findings: No erythema.   Neurological:      General: No focal deficit present.      Mental Status: He is alert and oriented to person, place, and time.           ED Course & MDM   Diagnoses as of 04/25/25 1028   Abscess   Carbuncle and " furuncle                 No data recorded     [place] Coma Scale Score: 15 (04/25/25 0933 : Myla Murillo, EMT)                           Medical Decision Making  Patient presented to the ED for evaluation of bump on abdomen with concerning PMHx of DM.  I personally reviewed and interpreted VS which are as stated above in the ED course.    Assessment/evaluation consistent with superficial carbuncle/furuncle.  No concerning history, clinical evidence/work-up, or exam findings for the considered differentials of deep space abscess, communicating fistula with intra-abdominal cavity.  These conditions have been thoroughly evaluated and determined to be sufficiently unlikely to be the etiology of patient's presenting symptoms.    Prescribed none.  After receiving an appropriate exam, clinical work-up, and necessary interventions/treatment, Patient is appropriate for discharge at this time due to no concerning symptoms or findings requiring hospitalization for stabilization or further interrogation/management and is appropriate for management of symptoms at home with recommended appropriate outpatient follow-up.  Patient was encouraged to ask any questions or for clarification of today's ED encounter.  Patient is agreeable to plan of care. Discussed with Patient today's results/findings and likely diagnosis, provided appropriate RTED precautions along with recommended follow-up with PCP.      Per Chart Review: Nothing pertinent to this ED encounter.      Parts of this chart have been completed using voice-to-text recognition software. Please excuse any errors of transcription that were missed for editing/correcting.        Procedure  Procedures       [1]   Past Medical History:  Diagnosis Date    CAD (coronary artery disease)     Diabetes mellitus type 1 (Multi)     Diabetic retinopathy (Multi)     Disorder 08/25/2023    HTN (hypertension)     Hypercholesteremia     Pain in scrotum 12/07/2023    Personal history of  other diseases of the nervous system and sense organs     History of cataract    Presence of artificial eye 08/14/2018    Eye globe prosthesis    Prostate cancer (Multi)    [2]   Past Surgical History:  Procedure Laterality Date    OTHER SURGICAL HISTORY  09/11/2018    Globe Enucleation Right   [3] No family history on file.  [4]   Social History  Tobacco Use    Smoking status: Never     Passive exposure: Never    Smokeless tobacco: Never   Vaping Use    Vaping status: Never Used   Substance Use Topics    Alcohol use: Never    Drug use: Never        Moo Allen MD  04/25/25 1020

## 2025-04-25 NOTE — DISCHARGE INSTRUCTIONS
Thank you for allowing myself and the team to take care of you during your emergency situation and addressing your health concerns.    You were evaluated for abdominal skin abscess.     Your likely condition/diagnosis: Small abscess    During your visit in the emergency department you were evaluated for your presenting symptoms.  Based on the extensive workup you received,  all efforts were made to identify the source of your symptoms and identify any life-threatening conditions.  These life-threatening conditions that were attempted to be identified and medically managed/treated include but not limited to communicating abscess with intra-abdominal cavity.  Additionally, you may be experiencing symptoms that are non-life-threatening but are uncomfortable and disruptive to your everyday life.  All efforts were made to manage your symptoms while in the emergency department along with appropriate at home therapy for symptomatic management during your recovery. Please be aware that not all of the conditions explained/discussed in these discharge instructions are applicable to your condition but encompass many of the conditions that were evaluated for during your ED encounter.      During your evaluation and assessment, all measures were taken to evaluate you and address your health concerns to identify dangerous and life threatening health conditions. It is not possible to identify all health conditions or pathologies and eliminate any chance of unfavorable outcomes while in the Emergency Department. My team encourages you to be vigilant with your health and follow-up with the appropriate providers outlined in your discharge paperwork. Please return to the Emergency Department if you feel that your health has not improved or experiencing worsening symptoms.    Special instructions please make a follow-up appoint with your primary care physician to further manage symptoms address your health concerns.  Please complete  the antibiotic course that you were prescribed.    Incidental findings:  None

## 2025-05-27 PROCEDURE — RXMED WILLOW AMBULATORY MEDICATION CHARGE

## 2025-05-29 ENCOUNTER — PHARMACY VISIT (OUTPATIENT)
Dept: PHARMACY | Facility: CLINIC | Age: 80
End: 2025-05-29
Payer: COMMERCIAL

## 2025-06-17 ENCOUNTER — OFFICE VISIT (OUTPATIENT)
Dept: PRIMARY CARE | Facility: CLINIC | Age: 80
End: 2025-06-17
Payer: MEDICARE

## 2025-06-17 VITALS
DIASTOLIC BLOOD PRESSURE: 60 MMHG | BODY MASS INDEX: 29.63 KG/M2 | OXYGEN SATURATION: 98 % | SYSTOLIC BLOOD PRESSURE: 124 MMHG | WEIGHT: 207 LBS | HEIGHT: 70 IN | HEART RATE: 62 BPM

## 2025-06-17 DIAGNOSIS — M62.838 MUSCLE SPASM: Primary | ICD-10-CM

## 2025-06-17 DIAGNOSIS — M54.50 ACUTE LEFT-SIDED LOW BACK PAIN WITHOUT SCIATICA: ICD-10-CM

## 2025-06-17 PROCEDURE — 99213 OFFICE O/P EST LOW 20 MIN: CPT | Performed by: INTERNAL MEDICINE

## 2025-06-17 PROCEDURE — G2211 COMPLEX E/M VISIT ADD ON: HCPCS | Performed by: INTERNAL MEDICINE

## 2025-06-17 PROCEDURE — 3078F DIAST BP <80 MM HG: CPT | Performed by: INTERNAL MEDICINE

## 2025-06-17 PROCEDURE — 1126F AMNT PAIN NOTED NONE PRSNT: CPT | Performed by: INTERNAL MEDICINE

## 2025-06-17 PROCEDURE — 1159F MED LIST DOCD IN RCRD: CPT | Performed by: INTERNAL MEDICINE

## 2025-06-17 PROCEDURE — 3074F SYST BP LT 130 MM HG: CPT | Performed by: INTERNAL MEDICINE

## 2025-06-17 RX ORDER — DICLOFENAC SODIUM 10 MG/G
4 GEL TOPICAL 4 TIMES DAILY
Qty: 100 G | Refills: 0 | Status: SHIPPED | OUTPATIENT
Start: 2025-06-17

## 2025-06-17 RX ORDER — NAPROXEN 500 MG/1
500 TABLET ORAL 2 TIMES DAILY PRN
Qty: 10 TABLET | Refills: 0 | Status: SHIPPED | OUTPATIENT
Start: 2025-06-17 | End: 2025-06-22

## 2025-06-17 RX ORDER — LIDOCAINE 50 MG/G
1 PATCH TOPICAL DAILY
Qty: 10 PATCH | Refills: 0 | Status: SHIPPED | OUTPATIENT
Start: 2025-06-17

## 2025-06-17 RX ORDER — CYCLOBENZAPRINE HCL 10 MG
10 TABLET ORAL 2 TIMES DAILY
Qty: 20 TABLET | Refills: 0 | Status: SHIPPED | OUTPATIENT
Start: 2025-06-17 | End: 2025-06-27

## 2025-06-17 ASSESSMENT — ENCOUNTER SYMPTOMS
LOSS OF SENSATION IN FEET: 0
OCCASIONAL FEELINGS OF UNSTEADINESS: 1
DEPRESSION: 0

## 2025-06-17 ASSESSMENT — PATIENT HEALTH QUESTIONNAIRE - PHQ9
SUM OF ALL RESPONSES TO PHQ9 QUESTIONS 1 AND 2: 0
1. LITTLE INTEREST OR PLEASURE IN DOING THINGS: NOT AT ALL
2. FEELING DOWN, DEPRESSED OR HOPELESS: NOT AT ALL

## 2025-06-17 ASSESSMENT — PAIN SCALES - GENERAL: PAINLEVEL_OUTOF10: 0-NO PAIN

## 2025-06-17 NOTE — PROGRESS NOTES
[facility]: [facility]  PROGRESS NOTE      Lowell Mcneill is a 79 y.o. male that is being seen  today for Follow-up (Dizzy spells x2 years).  Subjective   Patient is a 79-year-old male who is being seen for acute visit.  When patient scheduled appointment few days ago it was for dizziness which has already improved.  Patient does complain of muscle spasm in his lower back.  Patient stated that he lifted something heavy few days ago and after that he started having low back pain on the right side and is having difficulty in getting up from the sitting position.  Denies any numbness or tingling.      ROS  Negative for fever or chills  Negative for sore throat, ear pain, nasal discharge  Negative for cough, shortness of breath or wheezing  Negative for chest pain, palpitations, swelling of legs  Negative for abdominal pain, constipation, diarrhea, blood in the stools  Negative for urinary complaints  Negative for headache, dizziness, weakness or numbness  Positive for back pain  Negative for depression or anxiety  All other systems reviewed and were negative   Vitals:    06/17/25 1020   BP: 124/60   Pulse: 62   SpO2: 98%      Vitals:    06/17/25 1020   Weight: 93.9 kg (207 lb)     Body mass index is 29.7 kg/m².  Physical Exam  Constitutional: Patient does not appear to be in any acute distress  Head and Face: NCAT  Eyes: Normal external exam, EOMI  ENT: Normal external inspection of ears and nose. Oropharynx normal.  Cardiovascular: RRR, S1/S2, no murmurs, rubs, or gallops, radial pulses +2, no edema of extremities  Pulmonary: CTAB, no respiratory distress.  Abdomen: +BS, soft, non-tender, nondistended, no guarding or rebound, no masses noted  MSK: No spinal tenderness.  Positive for tenderness over the paraspinal muscles on the right side.  Decreased range of motion of the back due to pain.  Skin- No lesions, contusions, or erythema.  Peripheral puslses palpable bilaterally 2+  Neuro: AAO X3,  Cranial nerves 2-12 grossly intact,DTR 2+ in all 4 limbs   Psychiatric: Judgment intact. Appropriate mood and behavior    LABS   [unfilled]  Lab Results   Component Value Date    GLUCOSE 119 (H) 04/14/2025    CALCIUM 8.8 04/14/2025     04/14/2025    K 4.1 04/14/2025    CO2 27 04/14/2025     04/14/2025    BUN 19 04/14/2025    CREATININE 1.04 04/14/2025     Lab Results   Component Value Date    ALT 22 04/14/2025    AST 20 04/14/2025    ALKPHOS 75 04/14/2025    BILITOT 0.4 04/14/2025     Lab Results   Component Value Date    WBC 6.1 04/14/2025    HGB 12.0 (L) 04/14/2025    HCT 37.7 (L) 04/14/2025    MCV 87.7 04/14/2025     04/14/2025     Lab Results   Component Value Date    CHOL 102 04/14/2025    CHOL 108 (L) 08/12/2024    CHOL 93 (L) 04/08/2022     Lab Results   Component Value Date    HDL 53 04/14/2025    HDL 63.0 08/12/2024    HDL 51 04/08/2022     Lab Results   Component Value Date    LDLCALC 35 04/14/2025    LDLCALC 32 (L) 08/12/2024    LDLCALC 31 (L) 04/08/2022     Lab Results   Component Value Date    TRIG 64 04/14/2025    TRIG 63 08/12/2024    TRIG 55 04/08/2022     Lab Results   Component Value Date    HGBA1C 9.4 (H) 04/14/2025     Other labs not included in the list above were reviewed either before or during this encounter.    History    Medical History[1]  Surgical History[2]  Family History[3]  Allergies[4]  Medications Ordered Prior to Encounter[5]  Immunization History   Administered Date(s) Administered    COVID-19, mRNA, LNP-S, PF, 30 mcg/0.3 mL dose 03/16/2021, 04/08/2021, 10/16/2021    Flu vaccine, quadrivalent, high-dose, preservative free, age 65y+ (FLUZONE) 09/17/2021, 10/16/2021, 09/21/2022    Flu vaccine, trivalent, preservative free, HIGH-DOSE, age 65y+ (Fluzone) 09/19/2016, 09/22/2017, 08/31/2018, 10/01/2019, 01/15/2020, 10/07/2024    Flu vaccine, trivalent, preservative free, age 6 months and greater (Fluarix/Fluzone/Flulaval) 10/05/2015    Influenza, Seasonal,  Quadrivalent, Adjuvanted 09/02/2020, 08/30/2023    Influenza, seasonal, injectable 10/06/2014    Influenza, trivalent, adjuvanted 08/22/2019    Novel influenza-H1N1-09, preservative-free 01/19/2010    Pfizer COVID-19 vaccine, 12 years and older, (30mcg/0.3mL) (Comirnaty) 10/24/2023    Pfizer COVID-19 vaccine, bivalent, age 12 years and older (30 mcg/0.3 mL) 09/21/2022    Pfizer Gray Cap SARS-CoV-2 04/12/2022    Pfizer Purple Cap SARS-CoV-2 03/09/2021    Pneumococcal conjugate vaccine, 20-valent (PREVNAR 20) 10/29/2024    Pneumococcal polysaccharide vaccine, 23-valent, age 2 years and older (PNEUMOVAX 23) 10/06/2014, 10/25/2020    RSV, 60 Years And Older (AREXVY) 11/10/2023    Tdap vaccine, age 7 year and older (BOOSTRIX, ADACEL) 08/16/2024    Zoster vaccine, recombinant, adult (SHINGRIX) 10/25/2020, 01/05/2021, 10/02/2023, 12/06/2023     Patient's medical history was reviewed and updated either before or during this encounter.  ASSESSMENT / PLAN:  Diagnoses and all orders for this visit:  Muscle spasm  -     cyclobenzaprine (Flexeril) 10 mg tablet; Take 1 tablet (10 mg) by mouth 2 times a day for 10 days.  Acute left-sided low back pain without sciatica  -     naproxen (Naprosyn) 500 mg tablet; Take 1 tablet (500 mg) by mouth 2 times a day as needed for mild pain (1 - 3) (pain) for up to 5 days.  -     diclofenac sodium (Voltaren) 1 % gel; Apply 4.5 inches (4 g) topically 4 times a day.  -     lidocaine (Lidoderm) 5 % patch; Place 1 patch over 12 hours on the skin once daily. Remove & discard patch within 12 hours or as directed by MD.  Patient has a muscle spasm of the paraspinal muscles.  Patient is being started on muscle relaxant and anti-inflammatory medication and also being started on lidocaine patch.        Darwin Mims MD          [1]   Past Medical History:  Diagnosis Date    CAD (coronary artery disease)     Diabetes mellitus type 1 (Multi)     Diabetic retinopathy (Multi)     Disorder 08/25/2023     HTN (hypertension)     Hypercholesteremia     Pain in scrotum 12/07/2023    Personal history of other diseases of the nervous system and sense organs     History of cataract    Presence of artificial eye 08/14/2018    Eye globe prosthesis    Prostate cancer (Multi)    [2]   Past Surgical History:  Procedure Laterality Date    OTHER SURGICAL HISTORY  09/11/2018    Globe Enucleation Right   [3] No family history on file.  [4] No Known Allergies  [5]   Current Outpatient Medications on File Prior to Visit   Medication Sig Dispense Refill    amLODIPine (Norvasc) 5 mg tablet TAKE 1 TABLET BY MOUTH EVERY DAY 90 tablet 1    aspirin 81 mg EC tablet Take 1 tablet (81 mg) by mouth once daily.      atorvastatin (Lipitor) 80 mg tablet TAKE 1 TABLET BY MOUTH EVERY DAY 90 tablet 3    Baqsimi 3 mg/actuation spray,non-aerosol As directed as needed for severe low blood sugar 2 each 1    benazepril (Lotensin) 10 mg tablet TAKE 1 TABLET BY MOUTH EVERY DAY AS DIRECTED 90 tablet 3    carvedilol (Coreg) 25 mg tablet TAKE 1 TABLET BY MOUTH TWICE A  tablet 3    clopidogrel (Plavix) 75 mg tablet TAKE 1 TABLET BY MOUTH EVERY DAY 90 tablet 3    ezetimibe (Zetia) 10 mg tablet TAKE 1 TABLET BY MOUTH EVERY DAY 90 tablet 3    Gvoke HypoPen 2-Pack 1 mg/0.2 mL auto-injector As directed as needed for severe low blood sugar 0.4 mL 1    insulin lispro (HumaLOG U-100 Insulin) 100 unit/mL injection Up to 100 units daily in insulin pump. 90 mL 3    latanoprost (Xalatan) 0.005 % ophthalmic solution Administer 1 drop into the left eye once daily at bedtime. 7.5 mL 3    sulfamethoxazole-trimethoprim (Bactrim DS) 800-160 mg tablet 1 pill po bid X 10 days 20 tablet 0    tamsulosin (Flomax) 0.4 mg 24 hr capsule Take 1 capsule (0.4 mg) by mouth once daily.       No current facility-administered medications on file prior to visit.

## 2025-06-19 ENCOUNTER — TELEPHONE (OUTPATIENT)
Dept: PRIMARY CARE | Facility: CLINIC | Age: 80
End: 2025-06-19
Payer: MEDICARE

## 2025-06-19 NOTE — TELEPHONE ENCOUNTER
"Medication prior auth denied for lidocaine 5% patch \"Decision Notes:  Medicare allows us to cover a drug only when it is a Part D drug. A Part D drug is one that is used for a  \"medically accepted indication.\" A medically accepted indication means the use is approved by the  Food and Drug Administration (FDA) OR the use is supported by one of the following accepted  references:  (1) [facility] Formulary Service Drug Information.  (2) [employer] DRUGDEX Information System.  LIDOCAINE DIS 5% PATCH is not FDA approved for your medical condition(s): Low back pain,  unspecified. In order to approve your medication your diagnosis must also include; Moderate to severe  chronic back pain. Therefore, your drug is denied because it is not being used for a \"medically accepted  indication.\"  Please advise.  "

## 2025-07-17 NOTE — PROGRESS NOTES
HPI   80 yo  of 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  Last A1c 8.4%, A1c-8.5%.     Pt is testing sugars 4+ times per day with Dexcom G7.  Pt is having low sugars 0-1 times/week. Pt is following a carb controlled diet and knows reasonable carb allowances. Pt is able to afford their medications. Pt is exercising, does martial arts, walks on treadmill daily.     Pump: Tandem tslim using control IQ with Dexcom G7    -initial training w/ [employer] Nov 2023,  met a second time for additional training shortly after      Basal:   12a 1.2  4a 1.2  12p 1.5  ICR:   12a 1:8  4a 1:5  12p 1:4.5   ISF:   12a 30  4a 20    12p 20       -does not have smartphone compatible with pop though wife does.       -pt is bolusing but only 0-1 times per day, suspect there is some confusion on carb amounts for pt      Previous insulin regimen: tresiba 23 units and humalog 1:4, plus isf 20>120.     14 day dexcom data: 63% in range, 3.1% low, pattern: 200's overnight, waking low 100's/after breakfast, mid 100's through the day, upper 100's through the day/mid 200's after dinner into bedtime.  -best levels since on pump       Taking atorvastatin 80mg and zetia 10mg for lipids and tolerating.     Taking benazaprl 10mg,  carvedilol 25mg (1/2 bid) & amlodipine 5mg for htn.         Current Outpatient Medications:     amLODIPine (Norvasc) 5 mg tablet, TAKE 1 TABLET BY MOUTH EVERY DAY, Disp: 90 tablet, Rfl: 1    aspirin 81 mg EC tablet, Take 1 tablet (81 mg) by mouth once daily., Disp: , Rfl:     atorvastatin (Lipitor) 80 mg tablet, TAKE 1 TABLET BY MOUTH EVERY DAY, Disp: 90 tablet, Rfl: 3    Baqsimi 3 mg/actuation spray,non-aerosol, As directed as needed for severe low blood sugar, Disp: 2 each, Rfl: 1    benazepril (Lotensin) 10 mg tablet, TAKE 1 TABLET BY MOUTH EVERY DAY AS DIRECTED, Disp: 90 tablet, Rfl: 3    carvedilol (Coreg) 25 mg tablet, TAKE 1 TABLET BY  MOUTH TWICE A DAY, Disp: 180 tablet, Rfl: 3    clopidogrel (Plavix) 75 mg tablet, TAKE 1 TABLET BY MOUTH EVERY DAY, Disp: 90 tablet, Rfl: 3    diclofenac sodium (Voltaren) 1 % gel, Apply 4.5 inches (4 g) topically 4 times a day., Disp: 100 g, Rfl: 0    ezetimibe (Zetia) 10 mg tablet, TAKE 1 TABLET BY MOUTH EVERY DAY, Disp: 90 tablet, Rfl: 3    Gvoke HypoPen 2-Pack 1 mg/0.2 mL auto-injector, As directed as needed for severe low blood sugar, Disp: 0.4 mL, Rfl: 1    insulin lispro (HumaLOG U-100 Insulin) 100 unit/mL injection, Up to 100 units daily in insulin pump., Disp: 90 mL, Rfl: 3    latanoprost (Xalatan) 0.005 % ophthalmic solution, Administer 1 drop into the left eye once daily at bedtime., Disp: 7.5 mL, Rfl: 3    lidocaine (Lidoderm) 5 % patch, Place 1 patch over 12 hours on the skin once daily. Remove & discard patch within 12 hours or as directed by MD., Disp: 10 patch, Rfl: 0    sulfamethoxazole-trimethoprim (Bactrim DS) 800-160 mg tablet, 1 pill po bid X 10 days, Disp: 20 tablet, Rfl: 0    tamsulosin (Flomax) 0.4 mg 24 hr capsule, Take 1 capsule (0.4 mg) by mouth once daily., Disp: , Rfl:     cyclobenzaprine (Flexeril) 10 mg tablet, Take 1 tablet (10 mg) by mouth 2 times a day for 10 days., Disp: 20 tablet, Rfl: 0      Allergies as of 07/18/2025    (No Known Allergies)         Review of Systems   Cardiology: Lightheadedness-denies.  Chest pain-denies.  Leg edema-denies.  Palpitations-denies.  Respiratory: Cough-denies. Shortness of breath-denies.  Wheezing-denies.  Gastroenterology: Constipation-denies.  Diarrhea-denies.  Heartburn-denies.  Endocrinology: Cold intolerance-denies.  Heat intolerance-denies.  Sweats-denies.  Neurology: Headache-denies.  Tremor-denies.  Neuropathy in extremities-denies.  Psychology: Low energy-denies.  Irritability-denies.  Sleep disturbances-denies.      /72 (BP Location: Left arm, Patient Position: Sitting, BP Cuff Size: Adult)   Pulse 65   Wt 91.4 kg (201 lb 9.6 oz)    BMI 28.93 kg/m²       Labs:  Lab Results   Component Value Date    WBC 6.1 04/14/2025    NRBC 0.0 08/12/2024    RBC 4.30 04/14/2025    HGB 12.0 (L) 04/14/2025    HCT 37.7 (L) 04/14/2025     04/14/2025     Lab Results   Component Value Date    CALCIUM 8.8 04/14/2025    AST 20 04/14/2025    ALKPHOS 75 04/14/2025    BILITOT 0.4 04/14/2025    PROT 6.5 04/14/2025    ALBUMIN 4.2 04/14/2025    GLOB 2.6 10/14/2022    AGR 1.5 10/14/2022     04/14/2025    K 4.1 04/14/2025     04/14/2025    CO2 27 04/14/2025    ANIONGAP 7 04/14/2025    BUN 19 04/14/2025    CREATININE 1.04 04/14/2025    UREACREAUR 15.4 07/13/2023    GLUCOSE 119 (H) 04/14/2025    ALT 22 04/14/2025    EGFR 73 04/14/2025     Lab Results   Component Value Date    CHOL 102 04/14/2025    TRIG 64 04/14/2025    HDL 53 04/14/2025    LDLCALC 35 04/14/2025     Lab Results   Component Value Date    MICROALBCREA 76 (H) 04/14/2025     Lab Results   Component Value Date    TSH 1.98 04/14/2025     Lab Results   Component Value Date    [id number] 620 04/19/2021     Lab Results   Component Value Date    HGBA1C 8.5 (A) 07/18/2025         Physical Exam   General Appearance: pleasant, cooperative, no acute distress  HEENT: no chemosis, no proptosis, no lid lag, no lid retraction  Neck: no lymphadenopathy, no thyromegaly, no dominant thyroid nodules  Heart: no murmurs, regular rate and rhythm, S1 and S2  Lungs: no wheezes, no rhonci, no rales  Extremities: no lower extremity swelling      Assessment/Plan   1. Type 1 diabetes mellitus with hyperglycemia (Multi) (Primary)  -A1c ordered and reviewed  -cgm/pump data reviewed  -labs reviewed    -overall improved, issue is entering carbs at meals  -simply enter 20-30 grams carb for each meal  -will work with CDE's in the next month for dose titration  -this is the most stable cgm data that we have to date, definitely improving    2. Mixed hyperlipidemia  -on target on statin, will follow    3. Hypertension  -at target  on therapy, will follow      Follow Up:  Cde 1 month    Medical Decision Making  Complexity of problem: Chronic illness of diabetes mellitus uncontrolled, progressing  Data analyzed and reviewed: Reviewed prior notes, blood glucose data, labs including HgbA1c, lipids, serum chemistries.  Ordered tests.   Risk of complications and morbidities: Is definite because of use of insulin and risk of hypoglycemia.  Prescription medications reviewed and modifications made.  Compliance assessed.  Addressed social determinants of health including food insecurity.

## 2025-07-18 ENCOUNTER — APPOINTMENT (OUTPATIENT)
Dept: ENDOCRINOLOGY | Facility: CLINIC | Age: 80
End: 2025-07-18
Payer: MEDICARE

## 2025-07-18 VITALS
DIASTOLIC BLOOD PRESSURE: 72 MMHG | BODY MASS INDEX: 28.93 KG/M2 | SYSTOLIC BLOOD PRESSURE: 138 MMHG | WEIGHT: 201.6 LBS | HEART RATE: 65 BPM

## 2025-07-18 DIAGNOSIS — E78.2 MIXED HYPERLIPIDEMIA: ICD-10-CM

## 2025-07-18 DIAGNOSIS — E10.65 TYPE 1 DIABETES MELLITUS WITH HYPERGLYCEMIA (MULTI): Primary | ICD-10-CM

## 2025-07-18 DIAGNOSIS — I10 HYPERTENSION, UNSPECIFIED TYPE: ICD-10-CM

## 2025-07-18 LAB — POC HEMOGLOBIN A1C: 8.5 % (ref 4.2–6.5)

## 2025-07-18 PROCEDURE — 1159F MED LIST DOCD IN RCRD: CPT | Performed by: INTERNAL MEDICINE

## 2025-07-18 PROCEDURE — 99214 OFFICE O/P EST MOD 30 MIN: CPT | Performed by: INTERNAL MEDICINE

## 2025-07-18 PROCEDURE — 83036 HEMOGLOBIN GLYCOSYLATED A1C: CPT | Mod: QW | Performed by: INTERNAL MEDICINE

## 2025-07-18 PROCEDURE — 3078F DIAST BP <80 MM HG: CPT | Performed by: INTERNAL MEDICINE

## 2025-07-18 PROCEDURE — 1125F AMNT PAIN NOTED PAIN PRSNT: CPT | Performed by: INTERNAL MEDICINE

## 2025-07-18 PROCEDURE — 3075F SYST BP GE 130 - 139MM HG: CPT | Performed by: INTERNAL MEDICINE

## 2025-07-18 ASSESSMENT — ENCOUNTER SYMPTOMS
DEPRESSION: 1
LOSS OF SENSATION IN FEET: 0
OCCASIONAL FEELINGS OF UNSTEADINESS: 0

## 2025-07-18 ASSESSMENT — PAIN SCALES - GENERAL: PAINLEVEL_OUTOF10: 6

## 2025-07-23 NOTE — PROGRESS NOTES
HPI   78 yo  of 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  Last A1c 8.4%, A1c-8.5%.     Pt is testing sugars 4+ times per day with Dexcom G7.  Pt is having low sugars 0-1 times/week. Pt is following a carb controlled diet and knows reasonable carb allowances. Pt is able to afford their medications. Pt is exercising, does martial arts, walks on treadmill daily.     Pump: Tandem tslim using control IQ with Dexcom G7    -initial training w/ [facility] Nov 2023,  met a second time for additional training shortly after      Basal:   12a 1.2  4a 1.2  12p 1.5  ICR:   12a 1:8  4a 1:5  12p 1:4.5   ISF:   12a 30  4a 20    12p 20       -does not have smartphone compatible with pop though wife does.       -pt is bolusing but only 0-1 times per day, suspect there is some confusion on carb amounts for pt      Previous insulin regimen: tresiba 23 units and humalog 1:4, plus isf 20>120.     14 day dexcom data: 63% in range, 3.1% low, pattern: 200's overnight, waking low 100's/after breakfast, mid 100's through the day, upper 100's through the day/mid 200's after dinner into bedtime.  -best levels since on pump       Taking atorvastatin 80mg and zetia 10mg for lipids and tolerating.     Taking benazaprl 10mg,  carvedilol 25mg (1/2 bid) & amlodipine 5mg for htn.    4/25bb  1. Type 1 diabetes mellitus with hyperglycemia (Multi) (Primary)  -pt with small abscess 12 days out about the size of a quarter on abdomen, firm/no discharge    -warm compress bid  -give rx for bactrim ds bid X 10 days, if not improved in 3 days go to ER to consider I and D    -labs/cgm/pump data reviewed  -some improvement in sugars now that pt is entering carbs when eating, issue is only doing this about 50% of the time  -continue to bolus at meals, enter at least 30 grams/carb if uncertain    2. Mixed hyperlipidemia  -on statin and tolerating, labs reviewed    3. Hypertension,  unspecified type  -at target will follow      Follow Up:  -keep scheduled visit          Current Outpatient Medications:     amLODIPine (Norvasc) 5 mg tablet, TAKE 1 TABLET BY MOUTH EVERY DAY, Disp: 90 tablet, Rfl: 1    aspirin 81 mg EC tablet, Take 1 tablet (81 mg) by mouth once daily., Disp: , Rfl:     atorvastatin (Lipitor) 80 mg tablet, TAKE 1 TABLET BY MOUTH EVERY DAY, Disp: 90 tablet, Rfl: 3    Baqsimi 3 mg/actuation spray,non-aerosol, As directed as needed for severe low blood sugar, Disp: 2 each, Rfl: 1    benazepril (Lotensin) 10 mg tablet, TAKE 1 TABLET BY MOUTH EVERY DAY AS DIRECTED, Disp: 90 tablet, Rfl: 3    carvedilol (Coreg) 25 mg tablet, TAKE 1 TABLET BY MOUTH TWICE A DAY, Disp: 180 tablet, Rfl: 3    clopidogrel (Plavix) 75 mg tablet, TAKE 1 TABLET BY MOUTH EVERY DAY, Disp: 90 tablet, Rfl: 3    cyclobenzaprine (Flexeril) 10 mg tablet, Take 1 tablet (10 mg) by mouth 2 times a day for 10 days., Disp: 20 tablet, Rfl: 0    diclofenac sodium (Voltaren) 1 % gel, Apply 4.5 inches (4 g) topically 4 times a day., Disp: 100 g, Rfl: 0    ezetimibe (Zetia) 10 mg tablet, TAKE 1 TABLET BY MOUTH EVERY DAY, Disp: 90 tablet, Rfl: 3    Gvoke HypoPen 2-Pack 1 mg/0.2 mL auto-injector, As directed as needed for severe low blood sugar, Disp: 0.4 mL, Rfl: 1    insulin lispro (HumaLOG U-100 Insulin) 100 unit/mL injection, Up to 100 units daily in insulin pump., Disp: 90 mL, Rfl: 3    latanoprost (Xalatan) 0.005 % ophthalmic solution, Administer 1 drop into the left eye once daily at bedtime., Disp: 7.5 mL, Rfl: 3    lidocaine (Lidoderm) 5 % patch, Place 1 patch over 12 hours on the skin once daily. Remove & discard patch within 12 hours or as directed by MD., Disp: 10 patch, Rfl: 0    sulfamethoxazole-trimethoprim (Bactrim DS) 800-160 mg tablet, 1 pill po bid X 10 days, Disp: 20 tablet, Rfl: 0    tamsulosin (Flomax) 0.4 mg 24 hr capsule, Take 1 capsule (0.4 mg) by mouth once daily., Disp: , Rfl:     Allergies as of  07/25/2025    (No Known Allergies)       There were no vitals taken for this visit.    Labs:   Lab Results   Component Value Date    WBC 6.1 04/14/2025    NRBC 0.0 08/12/2024    RBC 4.30 04/14/2025    HGB 12.0 (L) 04/14/2025    HCT 37.7 (L) 04/14/2025     04/14/2025     Lab Results   Component Value Date    CALCIUM 8.8 04/14/2025    AST 20 04/14/2025    ALKPHOS 75 04/14/2025    BILITOT 0.4 04/14/2025    PROT 6.5 04/14/2025    ALBUMIN 4.2 04/14/2025    GLOB 2.6 10/14/2022    AGR 1.5 10/14/2022     04/14/2025    K 4.1 04/14/2025     04/14/2025    CO2 27 04/14/2025    ANIONGAP 7 04/14/2025    BUN 19 04/14/2025    CREATININE 1.04 04/14/2025    UREACREAUR 15.4 07/13/2023    GLUCOSE 119 (H) 04/14/2025    ALT 22 04/14/2025    EGFR 73 04/14/2025     Lab Results   Component Value Date    CHOL 102 04/14/2025    TRIG 64 04/14/2025    HDL 53 04/14/2025    LDLCALC 35 04/14/2025     Lab Results   Component Value Date    MICROALBCREA 76 (H) 04/14/2025     Lab Results   Component Value Date    TSH 1.98 04/14/2025     Lab Results   Component Value Date    [id number] 620 04/19/2021     Lab Results   Component Value Date    HGBA1C 8.5 (A) 07/18/2025         Assessment/Plan   1. Type 1 diabetes mellitus with hyperglycemia (Multi) (Primary)  ***    2. Hypertension, unspecified type  ***    3. Mixed hyperlipidemia  ***    4. Presence of hybrid closed-loop insulin pump system  ***          Follow up: ***    -labs/tests/notes reviewed  -reviewed and counseled patient on medication monitoring and side effects    Treatment and plan discussed with Dr. Martinez. Aleja ANN Certified Diabetes Care and

## 2025-07-24 ENCOUNTER — APPOINTMENT (OUTPATIENT)
Dept: OPHTHALMOLOGY | Facility: CLINIC | Age: 80
End: 2025-07-24
Payer: MEDICARE

## 2025-07-25 ENCOUNTER — APPOINTMENT (OUTPATIENT)
Dept: ENDOCRINOLOGY | Facility: CLINIC | Age: 80
End: 2025-07-25
Payer: MEDICARE

## 2025-07-25 DIAGNOSIS — E10.65 TYPE 1 DIABETES MELLITUS WITH HYPERGLYCEMIA (MULTI): Primary | ICD-10-CM

## 2025-07-25 DIAGNOSIS — I10 HYPERTENSION, UNSPECIFIED TYPE: ICD-10-CM

## 2025-07-25 DIAGNOSIS — Z96.41 PRESENCE OF HYBRID CLOSED-LOOP INSULIN PUMP SYSTEM: ICD-10-CM

## 2025-07-25 DIAGNOSIS — E78.2 MIXED HYPERLIPIDEMIA: ICD-10-CM

## 2025-07-29 NOTE — PROGRESS NOTES
HPI           Current Medications[1]      Allergies as of 07/30/2025    (No Known Allergies)         Review of Systems   Cardiology: Lightheadedness-denies.  Chest pain-denies.  Leg edema-denies.  Palpitations-denies.  Respiratory: Cough-denies. Shortness of breath-denies.  Wheezing-denies.  Gastroenterology: Constipation-denies.  Diarrhea-denies.  Heartburn-denies.  Endocrinology: Cold intolerance-denies.  Heat intolerance-denies.  Sweats-denies.  Neurology: Headache-denies.  Tremor-denies.  Neuropathy in extremities-denies.  Psychology: Low energy-denies.  Irritability-denies.  Sleep disturbances-denies.      There were no vitals taken for this visit.      Labs:  Lab Results   Component Value Date    WBC 6.1 04/14/2025    NRBC 0.0 08/12/2024    RBC 4.30 04/14/2025    HGB 12.0 (L) 04/14/2025    HCT 37.7 (L) 04/14/2025     04/14/2025     Lab Results   Component Value Date    CALCIUM 8.8 04/14/2025    AST 20 04/14/2025    ALKPHOS 75 04/14/2025    BILITOT 0.4 04/14/2025    PROT 6.5 04/14/2025    ALBUMIN 4.2 04/14/2025    GLOB 2.6 10/14/2022    AGR 1.5 10/14/2022     04/14/2025    K 4.1 04/14/2025     04/14/2025    CO2 27 04/14/2025    ANIONGAP 7 04/14/2025    BUN 19 04/14/2025    CREATININE 1.04 04/14/2025    UREACREAUR 15.4 07/13/2023    GLUCOSE 119 (H) 04/14/2025    ALT 22 04/14/2025    EGFR 73 04/14/2025     Lab Results   Component Value Date    CHOL 102 04/14/2025    TRIG 64 04/14/2025    HDL 53 04/14/2025    LDLCALC 35 04/14/2025     Lab Results   Component Value Date    MICROALBCREA 76 (H) 04/14/2025     Lab Results   Component Value Date    TSH 1.98 04/14/2025     Lab Results   Component Value Date    [id number] 620 04/19/2021     Lab Results   Component Value Date    HGBA1C 8.5 (A) 07/18/2025         Physical Exam   General Appearance: pleasant, cooperative, no acute distress  HEENT: no chemosis, no proptosis, no lid lag, no lid retraction  Neck: no lymphadenopathy, no thyromegaly, no  dominant thyroid nodules  Heart: no murmurs, regular rate and rhythm, S1 and S2  Lungs: no wheezes, no rhonci, no rales  Extremities: no lower extremity swelling      Assessment/Plan   1. Type 1 diabetes mellitus with hyperglycemia (Multi) (Primary)  ***    2. Mixed hyperlipidemia  ***    3. Hypertension, unspecified type  ***         Follow Up:      -labs/tests/notes reviewed  -reviewed and counseled patient on medication monitoring and side effects               [1]   Current Outpatient Medications:     amLODIPine (Norvasc) 5 mg tablet, TAKE 1 TABLET BY MOUTH EVERY DAY, Disp: 90 tablet, Rfl: 1    aspirin 81 mg EC tablet, Take 1 tablet (81 mg) by mouth once daily., Disp: , Rfl:     atorvastatin (Lipitor) 80 mg tablet, TAKE 1 TABLET BY MOUTH EVERY DAY, Disp: 90 tablet, Rfl: 3    Baqsimi 3 mg/actuation spray,non-aerosol, As directed as needed for severe low blood sugar, Disp: 2 each, Rfl: 1    benazepril (Lotensin) 10 mg tablet, TAKE 1 TABLET BY MOUTH EVERY DAY AS DIRECTED, Disp: 90 tablet, Rfl: 3    carvedilol (Coreg) 25 mg tablet, TAKE 1 TABLET BY MOUTH TWICE A DAY, Disp: 180 tablet, Rfl: 3    clopidogrel (Plavix) 75 mg tablet, TAKE 1 TABLET BY MOUTH EVERY DAY, Disp: 90 tablet, Rfl: 3    cyclobenzaprine (Flexeril) 10 mg tablet, Take 1 tablet (10 mg) by mouth 2 times a day for 10 days., Disp: 20 tablet, Rfl: 0    diclofenac sodium (Voltaren) 1 % gel, Apply 4.5 inches (4 g) topically 4 times a day., Disp: 100 g, Rfl: 0    ezetimibe (Zetia) 10 mg tablet, TAKE 1 TABLET BY MOUTH EVERY DAY, Disp: 90 tablet, Rfl: 3    Gvoke HypoPen 2-Pack 1 mg/0.2 mL auto-injector, As directed as needed for severe low blood sugar, Disp: 0.4 mL, Rfl: 1    insulin lispro (HumaLOG U-100 Insulin) 100 unit/mL injection, Up to 100 units daily in insulin pump., Disp: 90 mL, Rfl: 3    latanoprost (Xalatan) 0.005 % ophthalmic solution, Administer 1 drop into the left eye once daily at bedtime., Disp: 7.5 mL, Rfl: 3    lidocaine (Lidoderm) 5 %  patch, Place 1 patch over 12 hours on the skin once daily. Remove & discard patch within 12 hours or as directed by MD., Disp: 10 patch, Rfl: 0    sulfamethoxazole-trimethoprim (Bactrim DS) 800-160 mg tablet, 1 pill po bid X 10 days, Disp: 20 tablet, Rfl: 0    tamsulosin (Flomax) 0.4 mg 24 hr capsule, Take 1 capsule (0.4 mg) by mouth once daily., Disp: , Rfl:

## 2025-07-30 ENCOUNTER — TELEPHONE (OUTPATIENT)
Facility: CLINIC | Age: 80
End: 2025-07-30

## 2025-07-30 ENCOUNTER — APPOINTMENT (OUTPATIENT)
Facility: CLINIC | Age: 80
End: 2025-07-30
Payer: MEDICARE

## 2025-07-30 DIAGNOSIS — E78.2 MIXED HYPERLIPIDEMIA: ICD-10-CM

## 2025-07-30 DIAGNOSIS — E10.65 TYPE 1 DIABETES MELLITUS WITH HYPERGLYCEMIA (MULTI): Primary | ICD-10-CM

## 2025-07-30 DIAGNOSIS — I10 HYPERTENSION, UNSPECIFIED TYPE: ICD-10-CM

## 2025-07-30 NOTE — TELEPHONE ENCOUNTER
Lowell Mcneill called to reschedule the 2pm 07/31/2025 appt due to another appointment he had. I then scheduled him with Rupali in the morning instead.

## 2025-07-30 NOTE — TELEPHONE ENCOUNTER
Called Lowell. Left message seeing if he could come in tomorrow at 2pm to see Yadira versus today at 9:15am. Asked him to call me back.

## 2025-07-30 NOTE — PROGRESS NOTES
HPI     78 yo  of 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  Last A1c 8.4%, A1c-8.5%.     Pt is testing sugars 4+ times per day with Dexcom G7.  Pt is having low sugars 0-1 times/week. Pt is following a carb controlled diet and knows reasonable carb allowances. Pt is able to afford their medications. Pt is exercising, does martial arts, walks on treadmill daily.     Pump: Tandem tslim using control IQ with Dexcom G7    -initial training w/ [facility] Nov 2023,  met a second time for additional training shortly after      Basal:   12a 1.2  4a 1.2  12p 1.5  ICR:   12a 1:8  4a 1:5  12p 1:4.5   ISF:   12a 30  4a 20    12p 20       -does not have smartphone compatible with pop though wife does.       -pt is bolusing but only 0-1 times per day, suspect there is some confusion on carb amounts for pt      Previous insulin regimen: tresiba 23 units and humalog 1:4, plus isf 20>120.     14 day dexcom data: 63% in range, 3.1% low, pattern: 200's overnight, waking low 100's/after breakfast, mid 100's through the day, upper 100's through the day/mid 200's after dinner into bedtime.  -best levels since on pump       Taking atorvastatin 80mg and zetia 10mg for lipids and tolerating.     Taking benazaprl 10mg,  carvedilol 25mg (1/2 bid) & amlodipine 5mg for htn.      Current Medications[1]    Allergies as of 07/31/2025    (No Known Allergies)       There were no vitals taken for this visit.    Labs:   Lab Results   Component Value Date    WBC 6.1 04/14/2025    NRBC 0.0 08/12/2024    RBC 4.30 04/14/2025    HGB 12.0 (L) 04/14/2025    HCT 37.7 (L) 04/14/2025     04/14/2025     Lab Results   Component Value Date    CALCIUM 8.8 04/14/2025    AST 20 04/14/2025    ALKPHOS 75 04/14/2025    BILITOT 0.4 04/14/2025    PROT 6.5 04/14/2025    ALBUMIN 4.2 04/14/2025    GLOB 2.6 10/14/2022    AGR 1.5 10/14/2022     04/14/2025    K 4.1 04/14/2025    CL  109 04/14/2025    CO2 27 04/14/2025    ANIONGAP 7 04/14/2025    BUN 19 04/14/2025    CREATININE 1.04 04/14/2025    UREACREAUR 15.4 07/13/2023    GLUCOSE 119 (H) 04/14/2025    ALT 22 04/14/2025    EGFR 73 04/14/2025     Lab Results   Component Value Date    CHOL 102 04/14/2025    TRIG 64 04/14/2025    HDL 53 04/14/2025    LDLCALC 35 04/14/2025     Lab Results   Component Value Date    MICROALBCREA 76 (H) 04/14/2025     Lab Results   Component Value Date    TSH 1.98 04/14/2025     Lab Results   Component Value Date    [id number] 620 04/19/2021     Lab Results   Component Value Date    HGBA1C 8.5 (A) 07/18/2025         Assessment/Plan         Follow up: ***    -labs/tests/notes reviewed  -reviewed and counseled patient on medication monitoring and side effects    Treatment and plan discussed with Dr. Martinez.  JINNY Ramirez, PharmD, BC-ADM, [facility].        [1]   Current Outpatient Medications:     amLODIPine (Norvasc) 5 mg tablet, TAKE 1 TABLET BY MOUTH EVERY DAY, Disp: 90 tablet, Rfl: 1    aspirin 81 mg EC tablet, Take 1 tablet (81 mg) by mouth once daily., Disp: , Rfl:     atorvastatin (Lipitor) 80 mg tablet, TAKE 1 TABLET BY MOUTH EVERY DAY, Disp: 90 tablet, Rfl: 3    Baqsimi 3 mg/actuation spray,non-aerosol, As directed as needed for severe low blood sugar, Disp: 2 each, Rfl: 1    benazepril (Lotensin) 10 mg tablet, TAKE 1 TABLET BY MOUTH EVERY DAY AS DIRECTED, Disp: 90 tablet, Rfl: 3    carvedilol (Coreg) 25 mg tablet, TAKE 1 TABLET BY MOUTH TWICE A DAY, Disp: 180 tablet, Rfl: 3    clopidogrel (Plavix) 75 mg tablet, TAKE 1 TABLET BY MOUTH EVERY DAY, Disp: 90 tablet, Rfl: 3    cyclobenzaprine (Flexeril) 10 mg tablet, Take 1 tablet (10 mg) by mouth 2 times a day for 10 days., Disp: 20 tablet, Rfl: 0    diclofenac sodium (Voltaren) 1 % gel, Apply 4.5 inches (4 g) topically 4 times a day., Disp: 100 g, Rfl: 0    ezetimibe (Zetia) 10 mg tablet, TAKE 1 TABLET BY MOUTH EVERY DAY, Disp: 90 tablet, Rfl: 3    Gvoke HypoPen  2-Pack 1 mg/0.2 mL auto-injector, As directed as needed for severe low blood sugar, Disp: 0.4 mL, Rfl: 1    insulin lispro (HumaLOG U-100 Insulin) 100 unit/mL injection, Up to 100 units daily in insulin pump., Disp: 90 mL, Rfl: 3    latanoprost (Xalatan) 0.005 % ophthalmic solution, Administer 1 drop into the left eye once daily at bedtime., Disp: 7.5 mL, Rfl: 3    lidocaine (Lidoderm) 5 % patch, Place 1 patch over 12 hours on the skin once daily. Remove & discard patch within 12 hours or as directed by MD., Disp: 10 patch, Rfl: 0    sulfamethoxazole-trimethoprim (Bactrim DS) 800-160 mg tablet, 1 pill po bid X 10 days, Disp: 20 tablet, Rfl: 0    tamsulosin (Flomax) 0.4 mg 24 hr capsule, Take 1 capsule (0.4 mg) by mouth once daily., Disp: , Rfl:

## 2025-07-31 ENCOUNTER — TELEPHONE (OUTPATIENT)
Facility: CLINIC | Age: 80
End: 2025-07-31

## 2025-07-31 ENCOUNTER — APPOINTMENT (OUTPATIENT)
Facility: CLINIC | Age: 80
End: 2025-07-31
Payer: MEDICARE

## 2025-07-31 DIAGNOSIS — E78.2 MIXED HYPERLIPIDEMIA: ICD-10-CM

## 2025-07-31 DIAGNOSIS — E10.65 TYPE 1 DIABETES MELLITUS WITH HYPERGLYCEMIA (MULTI): Primary | ICD-10-CM

## 2025-07-31 DIAGNOSIS — I10 HYPERTENSION, UNSPECIFIED TYPE: ICD-10-CM

## 2025-07-31 NOTE — TELEPHONE ENCOUNTER
Renata Etienne 11:15am appointment on Monday 08/04/2025 with Rupali. Hoping this one isn't cancelled.

## 2025-08-04 ENCOUNTER — APPOINTMENT (OUTPATIENT)
Facility: CLINIC | Age: 80
End: 2025-08-04
Payer: MEDICARE

## 2025-08-04 VITALS
HEART RATE: 71 BPM | DIASTOLIC BLOOD PRESSURE: 58 MMHG | WEIGHT: 200 LBS | BODY MASS INDEX: 28.7 KG/M2 | SYSTOLIC BLOOD PRESSURE: 121 MMHG

## 2025-08-04 DIAGNOSIS — I10 HYPERTENSION, UNSPECIFIED TYPE: ICD-10-CM

## 2025-08-04 DIAGNOSIS — E10.65 TYPE 1 DIABETES MELLITUS WITH HYPERGLYCEMIA (MULTI): Primary | ICD-10-CM

## 2025-08-04 DIAGNOSIS — E78.2 MIXED HYPERLIPIDEMIA: ICD-10-CM

## 2025-08-04 PROCEDURE — 99214 OFFICE O/P EST MOD 30 MIN: CPT | Performed by: PHARMACIST

## 2025-08-04 PROCEDURE — 99214 OFFICE O/P EST MOD 30 MIN: CPT | Performed by: INTERNAL MEDICINE

## 2025-08-04 PROCEDURE — 95251 CONT GLUC MNTR ANALYSIS I&R: CPT | Performed by: INTERNAL MEDICINE

## 2025-08-04 ASSESSMENT — ENCOUNTER SYMPTOMS
OCCASIONAL FEELINGS OF UNSTEADINESS: 0
LOSS OF SENSATION IN FEET: 0
DEPRESSION: 0

## 2025-08-04 ASSESSMENT — PAIN SCALES - GENERAL: PAINLEVEL_OUTOF10: 0-NO PAIN

## 2025-08-04 NOTE — PATIENT INSTRUCTIONS
No changes to pump settings    Continue to enter grams carb into pump before all meals     Avoid overtreating lows, should only need 5-10 grams carb (1 or 2 glucose tabs)

## 2025-08-04 NOTE — PROGRESS NOTES
Attestation signed by Te Martinez MD on 8/4/25 at 11:55 AM.    I, Dr Te Martinez, have reviewed this progress note, medication list, vital signs, any pertinent lab values, and any CGM data if present with the Certified Diabetes Care and  face to face during this visit today. This note reflects the treatment plan that was made under my direction after reviewing the above mentioned elements while face to face with the patient and CDE.  I personally answered and addressed any questions and concerns the patient had during the visit today.  The CDE entered the data in this note under my direction and I personally reviewed it, signed any lab or medication orders that I instructed to be completed. I am the billing provider for this visit and the level of service was determined by my involvement in the Medical Decision Making Component of this visit while face to face with the patient.

## 2025-08-04 NOTE — PROGRESS NOTES
HPI     78 yo  of 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  Last A1c 8.5%.     Pt is testing sugars 4+ times per day with Dexcom G7.  Pt is having low sugars 0-1 times/week. Pt is following a carb controlled diet and knows reasonable carb allowances. Pt is able to afford their medications. Pt is exercising, does martial arts, walks on treadmill daily.    Pump: Tandem tslim using control IQ with Dexcom G7    -initial training w/ [facility] Nov 2023,  met a second time for additional training shortly after      Basal:   12a 1.2  4a 1.2  12p 1.5  ICR:   12a 1:8  4a 1:5  12p 1:4.5   ISF:   12a 30  4a 20    12p 20      -does not have smartphone compatible with pop though wife does.    -pt doing better bolusing for meals since last visit     Previous insulin regimen: tresiba 23 units and humalog 1:4, plus isf 20>120.     14 day dexcom data: 69% in range, 1.5% low, pattern:         Taking atorvastatin 80mg and zetia 10mg for lipids and tolerating.     Taking benazaprl 10mg,  carvedilol 25mg (1/2 bid) & amlodipine 5mg for htn.      Current Medications[1]    Allergies as of 08/04/2025    (No Known Allergies)       /58 (BP Location: Left arm, Patient Position: Sitting, BP Cuff Size: Adult long)   Pulse 71   Wt 90.7 kg (200 lb)   BMI 28.70 kg/m²     Labs:   Lab Results   Component Value Date    WBC 6.1 04/14/2025    NRBC 0.0 08/12/2024    RBC 4.30 04/14/2025    HGB 12.0 (L) 04/14/2025    HCT 37.7 (L) 04/14/2025     04/14/2025     Lab Results   Component Value Date    CALCIUM 8.8 04/14/2025    AST 20 04/14/2025    ALKPHOS 75 04/14/2025    BILITOT 0.4 04/14/2025    PROT 6.5 04/14/2025    ALBUMIN 4.2 04/14/2025    GLOB 2.6 10/14/2022    AGR 1.5 10/14/2022     04/14/2025    K 4.1 04/14/2025     04/14/2025    CO2 27 04/14/2025    ANIONGAP 7 04/14/2025    BUN 19 04/14/2025    CREATININE 1.04 04/14/2025    UREACREAUR 15.4  07/13/2023    GLUCOSE 119 (H) 04/14/2025    ALT 22 04/14/2025    EGFR 73 04/14/2025     Lab Results   Component Value Date    CHOL 102 04/14/2025    TRIG 64 04/14/2025    HDL 53 04/14/2025    LDLCALC 35 04/14/2025     Lab Results   Component Value Date    MICROALBCREA 76 (H) 04/14/2025     Lab Results   Component Value Date    TSH 1.98 04/14/2025     Lab Results   Component Value Date    DPTHBQBY03 620 04/19/2021     Lab Results   Component Value Date    HGBA1C 8.5 (A) 07/18/2025         Assessment/Plan   1. Type 1 diabetes mellitus with hyperglycemia (Multi) (Primary)    -A1c reviewed  -labs reviewed  -pump/cgm data reviewed, scanned into epic    -continues to improve now that pt has been more consistent with entering carbs at meals   -no clear place needing adjustment in settings at this time   -avoid overtreating lows, should only need 5-10g carb     2. Mixed hyperlipidemia  -on statin, tolerating, reviewed labs     3. Hypertension, unspecified type  -at target on therapy       Follow up: 3mon educator, cancel dec w/ bb    -labs/tests/notes reviewed  -reviewed and counseled patient on medication monitoring and side effects    Treatment and plan discussed with Dr. Martinez.  JINNY Ramirez, PharmD, [facility], [facility].     Medical Decision Making  Complexity of problem: Chronic illness of diabetes mellitus uncontrolled, progressing  Data analyzed and reviewed: Reviewed prior notes, blood glucose data, labs including HgbA1c, lipids, serum chemistries.  Ordered tests.   Risk of complications and morbidities: Is definite because of use of insulin and risk of hypoglycemia.  Prescription medications reviewed and modifications made.  Compliance assessed.  Addressed social determinants of health including food insecurity.         [1]   Current Outpatient Medications:     amLODIPine (Norvasc) 5 mg tablet, TAKE 1 TABLET BY MOUTH EVERY DAY, Disp: 90 tablet, Rfl: 1    aspirin 81 mg EC tablet, Take 1 tablet (81 mg) by mouth once daily.,  Disp: , Rfl:     atorvastatin (Lipitor) 80 mg tablet, TAKE 1 TABLET BY MOUTH EVERY DAY, Disp: 90 tablet, Rfl: 3    Baqsimi 3 mg/actuation spray,non-aerosol, As directed as needed for severe low blood sugar, Disp: 2 each, Rfl: 1    benazepril (Lotensin) 10 mg tablet, TAKE 1 TABLET BY MOUTH EVERY DAY AS DIRECTED, Disp: 90 tablet, Rfl: 3    carvedilol (Coreg) 25 mg tablet, TAKE 1 TABLET BY MOUTH TWICE A DAY, Disp: 180 tablet, Rfl: 3    clopidogrel (Plavix) 75 mg tablet, TAKE 1 TABLET BY MOUTH EVERY DAY, Disp: 90 tablet, Rfl: 3    cyclobenzaprine (Flexeril) 10 mg tablet, Take 1 tablet (10 mg) by mouth 2 times a day for 10 days., Disp: 20 tablet, Rfl: 0    diclofenac sodium (Voltaren) 1 % gel, Apply 4.5 inches (4 g) topically 4 times a day., Disp: 100 g, Rfl: 0    ezetimibe (Zetia) 10 mg tablet, TAKE 1 TABLET BY MOUTH EVERY DAY, Disp: 90 tablet, Rfl: 3    Gvoke HypoPen 2-Pack 1 mg/0.2 mL auto-injector, As directed as needed for severe low blood sugar, Disp: 0.4 mL, Rfl: 1    insulin lispro (HumaLOG U-100 Insulin) 100 unit/mL injection, Up to 100 units daily in insulin pump., Disp: 90 mL, Rfl: 3    latanoprost (Xalatan) 0.005 % ophthalmic solution, Administer 1 drop into the left eye once daily at bedtime., Disp: 7.5 mL, Rfl: 3    lidocaine (Lidoderm) 5 % patch, Place 1 patch over 12 hours on the skin once daily. Remove & discard patch within 12 hours or as directed by MD., Disp: 10 patch, Rfl: 0    sulfamethoxazole-trimethoprim (Bactrim DS) 800-160 mg tablet, 1 pill po bid X 10 days, Disp: 20 tablet, Rfl: 0    tamsulosin (Flomax) 0.4 mg 24 hr capsule, Take 1 capsule (0.4 mg) by mouth once daily., Disp: , Rfl:

## 2025-08-20 DIAGNOSIS — I10 PRIMARY HYPERTENSION: ICD-10-CM

## 2025-08-20 RX ORDER — AMLODIPINE BESYLATE 5 MG/1
5 TABLET ORAL DAILY
Qty: 90 TABLET | Refills: 0 | Status: SHIPPED | OUTPATIENT
Start: 2025-08-20

## 2025-08-28 DIAGNOSIS — I10 ESSENTIAL (PRIMARY) HYPERTENSION: ICD-10-CM

## 2025-08-28 RX ORDER — CARVEDILOL 25 MG/1
25 TABLET ORAL 2 TIMES DAILY
Qty: 180 TABLET | Refills: 3 | Status: SHIPPED | OUTPATIENT
Start: 2025-08-28

## 2025-09-03 DIAGNOSIS — E10.65 TYPE 1 DIABETES MELLITUS WITH HYPERGLYCEMIA (MULTI): ICD-10-CM

## 2025-09-03 RX ORDER — EZETIMIBE 10 MG/1
10 TABLET ORAL DAILY
Qty: 90 TABLET | Refills: 3 | Status: SHIPPED | OUTPATIENT
Start: 2025-09-03

## 2025-09-10 ENCOUNTER — APPOINTMENT (OUTPATIENT)
Dept: OPHTHALMOLOGY | Facility: CLINIC | Age: 80
End: 2025-09-10
Payer: MEDICARE

## 2025-09-11 ENCOUNTER — APPOINTMENT (OUTPATIENT)
Dept: OPHTHALMOLOGY | Facility: CLINIC | Age: 80
End: 2025-09-11
Payer: MEDICARE

## 2025-11-05 ENCOUNTER — APPOINTMENT (OUTPATIENT)
Facility: CLINIC | Age: 80
End: 2025-11-05
Payer: MEDICARE

## 2025-12-23 ENCOUNTER — APPOINTMENT (OUTPATIENT)
Facility: CLINIC | Age: 80
End: 2025-12-23
Payer: MEDICARE